# Patient Record
Sex: MALE | Race: WHITE | Employment: FULL TIME | URBAN - NONMETROPOLITAN AREA
[De-identification: names, ages, dates, MRNs, and addresses within clinical notes are randomized per-mention and may not be internally consistent; named-entity substitution may affect disease eponyms.]

---

## 2018-05-23 ENCOUNTER — HOSPITAL ENCOUNTER (OUTPATIENT)
Dept: LAB | Age: 23
Setting detail: SPECIMEN
Discharge: HOME | End: 2018-05-23
Payer: COMMERCIAL

## 2018-05-23 ENCOUNTER — OFFICE VISIT (OUTPATIENT)
Dept: PRIMARY CARE CLINIC | Age: 23
End: 2018-05-23
Payer: COMMERCIAL

## 2018-05-23 VITALS
BODY MASS INDEX: 27.3 KG/M2 | HEART RATE: 84 BPM | DIASTOLIC BLOOD PRESSURE: 78 MMHG | HEIGHT: 71 IN | OXYGEN SATURATION: 98 % | SYSTOLIC BLOOD PRESSURE: 124 MMHG | WEIGHT: 195 LBS

## 2018-05-23 DIAGNOSIS — F41.8 DEPRESSION WITH ANXIETY: Primary | ICD-10-CM

## 2018-05-23 DIAGNOSIS — F41.8 DEPRESSION WITH ANXIETY: ICD-10-CM

## 2018-05-23 LAB
ABSOLUTE EOS #: 0.1 K/UL (ref 0–0.4)
ABSOLUTE IMMATURE GRANULOCYTE: NORMAL K/UL (ref 0–0.3)
ABSOLUTE LYMPH #: 1.9 K/UL (ref 1–4.8)
ABSOLUTE MONO #: 0.7 K/UL (ref 0.1–1.2)
ALBUMIN SERPL-MCNC: 4.5 G/DL (ref 3.5–5.2)
ALBUMIN/GLOBULIN RATIO: 1.3 (ref 1–2.5)
ALP BLD-CCNC: 95 U/L (ref 40–129)
ALT SERPL-CCNC: 25 U/L (ref 5–41)
ANION GAP SERPL CALCULATED.3IONS-SCNC: 12 MMOL/L (ref 9–17)
AST SERPL-CCNC: 18 U/L
BASOPHILS # BLD: 0 % (ref 0–2)
BASOPHILS ABSOLUTE: 0 K/UL (ref 0–0.2)
BILIRUB SERPL-MCNC: 1.63 MG/DL (ref 0.3–1.2)
BUN BLDV-MCNC: 12 MG/DL (ref 6–20)
BUN/CREAT BLD: 15 (ref 9–20)
CALCIUM SERPL-MCNC: 9.8 MG/DL (ref 8.6–10.4)
CHLORIDE BLD-SCNC: 101 MMOL/L (ref 98–107)
CO2: 29 MMOL/L (ref 20–31)
CREAT SERPL-MCNC: 0.81 MG/DL (ref 0.7–1.2)
DIFFERENTIAL TYPE: NORMAL
EOSINOPHILS RELATIVE PERCENT: 1 % (ref 1–8)
GFR AFRICAN AMERICAN: >60 ML/MIN
GFR NON-AFRICAN AMERICAN: >60 ML/MIN
GFR SERPL CREATININE-BSD FRML MDRD: ABNORMAL ML/MIN/{1.73_M2}
GFR SERPL CREATININE-BSD FRML MDRD: ABNORMAL ML/MIN/{1.73_M2}
GLUCOSE BLD-MCNC: 95 MG/DL (ref 70–99)
HCT VFR BLD CALC: 50.6 % (ref 41–53)
HEMOGLOBIN: 17.2 G/DL (ref 13.5–17.5)
IMMATURE GRANULOCYTES: NORMAL %
LYMPHOCYTES # BLD: 23 % (ref 15–43)
MCH RBC QN AUTO: 29.6 PG (ref 26–34)
MCHC RBC AUTO-ENTMCNC: 33.9 G/DL (ref 31–37)
MCV RBC AUTO: 87.1 FL (ref 80–100)
MONOCYTES # BLD: 8 % (ref 6–14)
NRBC AUTOMATED: NORMAL PER 100 WBC
PDW BLD-RTO: 13.2 % (ref 11–14.5)
PLATELET # BLD: 266 K/UL (ref 140–450)
PLATELET ESTIMATE: NORMAL
PMV BLD AUTO: 8.8 FL (ref 6–12)
POTASSIUM SERPL-SCNC: 3.4 MMOL/L (ref 3.7–5.3)
RBC # BLD: 5.81 M/UL (ref 4.5–5.9)
RBC # BLD: NORMAL 10*6/UL
SEG NEUTROPHILS: 68 % (ref 44–74)
SEGMENTED NEUTROPHILS ABSOLUTE COUNT: 5.6 K/UL (ref 1.8–7.7)
SODIUM BLD-SCNC: 142 MMOL/L (ref 135–144)
TOTAL PROTEIN: 8.1 G/DL (ref 6.4–8.3)
TSH SERPL DL<=0.05 MIU/L-ACNC: 1.73 MIU/L (ref 0.3–5)
WBC # BLD: 8.3 K/UL (ref 3.5–11)
WBC # BLD: NORMAL 10*3/UL

## 2018-05-23 PROCEDURE — 4004F PT TOBACCO SCREEN RCVD TLK: CPT | Performed by: PHYSICIAN ASSISTANT

## 2018-05-23 PROCEDURE — 80053 COMPREHEN METABOLIC PANEL: CPT

## 2018-05-23 PROCEDURE — 84443 ASSAY THYROID STIM HORMONE: CPT

## 2018-05-23 PROCEDURE — G8427 DOCREV CUR MEDS BY ELIG CLIN: HCPCS | Performed by: PHYSICIAN ASSISTANT

## 2018-05-23 PROCEDURE — G8419 CALC BMI OUT NRM PARAM NOF/U: HCPCS | Performed by: PHYSICIAN ASSISTANT

## 2018-05-23 PROCEDURE — 85025 COMPLETE CBC W/AUTO DIFF WBC: CPT

## 2018-05-23 PROCEDURE — 36415 COLL VENOUS BLD VENIPUNCTURE: CPT

## 2018-05-23 PROCEDURE — 99204 OFFICE O/P NEW MOD 45 MIN: CPT | Performed by: PHYSICIAN ASSISTANT

## 2018-05-23 RX ORDER — DULOXETIN HYDROCHLORIDE 30 MG/1
30 CAPSULE, DELAYED RELEASE ORAL DAILY
Qty: 30 CAPSULE | Refills: 0 | Status: SHIPPED | OUTPATIENT
Start: 2018-05-23 | End: 2018-05-29 | Stop reason: DRUGHIGH

## 2018-05-23 ASSESSMENT — ENCOUNTER SYMPTOMS
BACK PAIN: 1
NAUSEA: 1
DIARRHEA: 1
VOMITING: 1
RESPIRATORY NEGATIVE: 1

## 2018-05-29 ENCOUNTER — OFFICE VISIT (OUTPATIENT)
Dept: FAMILY MEDICINE CLINIC | Age: 23
End: 2018-05-29
Payer: COMMERCIAL

## 2018-05-29 VITALS
OXYGEN SATURATION: 98 % | WEIGHT: 190 LBS | HEART RATE: 81 BPM | SYSTOLIC BLOOD PRESSURE: 124 MMHG | BODY MASS INDEX: 26.6 KG/M2 | HEIGHT: 71 IN | DIASTOLIC BLOOD PRESSURE: 70 MMHG

## 2018-05-29 DIAGNOSIS — F41.8 ANXIETY WITH DEPRESSION: Primary | ICD-10-CM

## 2018-05-29 PROCEDURE — G8419 CALC BMI OUT NRM PARAM NOF/U: HCPCS | Performed by: PHYSICIAN ASSISTANT

## 2018-05-29 PROCEDURE — 4004F PT TOBACCO SCREEN RCVD TLK: CPT | Performed by: PHYSICIAN ASSISTANT

## 2018-05-29 PROCEDURE — 99213 OFFICE O/P EST LOW 20 MIN: CPT | Performed by: PHYSICIAN ASSISTANT

## 2018-05-29 PROCEDURE — G8427 DOCREV CUR MEDS BY ELIG CLIN: HCPCS | Performed by: PHYSICIAN ASSISTANT

## 2018-05-29 RX ORDER — DULOXETIN HYDROCHLORIDE 60 MG/1
60 CAPSULE, DELAYED RELEASE ORAL DAILY
Qty: 30 CAPSULE | Refills: 2 | Status: ON HOLD | OUTPATIENT
Start: 2018-05-29 | End: 2018-06-04 | Stop reason: HOSPADM

## 2018-06-03 ENCOUNTER — HOSPITAL ENCOUNTER (INPATIENT)
Age: 23
LOS: 1 days | Discharge: HOME | DRG: 885 | End: 2018-06-04
Attending: PSYCHIATRY & NEUROLOGY | Admitting: PSYCHIATRY & NEUROLOGY
Payer: COMMERCIAL

## 2018-06-03 PROBLEM — F33.9 MAJOR DEPRESSIVE DISORDER, RECURRENT (HCC): Status: ACTIVE | Noted: 2018-06-03

## 2018-06-03 PROBLEM — F33.2 SEVERE EPISODE OF RECURRENT MAJOR DEPRESSIVE DISORDER, WITHOUT PSYCHOTIC FEATURES (HCC): Status: ACTIVE | Noted: 2018-06-03

## 2018-06-03 PROBLEM — F10.10 ALCOHOL ABUSE: Status: ACTIVE | Noted: 2018-06-03

## 2018-06-03 LAB
THYROXINE, FREE: 1.42 NG/DL (ref 0.93–1.7)
TSH SERPL DL<=0.05 MIU/L-ACNC: 1.44 MIU/L (ref 0.3–5)

## 2018-06-03 PROCEDURE — 6370000000 HC RX 637 (ALT 250 FOR IP): Performed by: PSYCHIATRY & NEUROLOGY

## 2018-06-03 PROCEDURE — 1240000000 HC EMOTIONAL WELLNESS R&B

## 2018-06-03 PROCEDURE — 36415 COLL VENOUS BLD VENIPUNCTURE: CPT

## 2018-06-03 PROCEDURE — 84443 ASSAY THYROID STIM HORMONE: CPT

## 2018-06-03 PROCEDURE — 90792 PSYCH DIAG EVAL W/MED SRVCS: CPT | Performed by: PSYCHIATRY & NEUROLOGY

## 2018-06-03 PROCEDURE — 84439 ASSAY OF FREE THYROXINE: CPT

## 2018-06-03 RX ORDER — DULOXETIN HYDROCHLORIDE 60 MG/1
60 CAPSULE, DELAYED RELEASE ORAL DAILY
Status: DISCONTINUED | OUTPATIENT
Start: 2018-06-04 | End: 2018-06-04 | Stop reason: HOSPADM

## 2018-06-03 RX ORDER — MAGNESIUM HYDROXIDE/ALUMINUM HYDROXICE/SIMETHICONE 120; 1200; 1200 MG/30ML; MG/30ML; MG/30ML
30 SUSPENSION ORAL EVERY 6 HOURS PRN
Status: DISCONTINUED | OUTPATIENT
Start: 2018-06-03 | End: 2018-06-04 | Stop reason: HOSPADM

## 2018-06-03 RX ORDER — ACETAMINOPHEN 325 MG/1
650 TABLET ORAL EVERY 4 HOURS PRN
Status: DISCONTINUED | OUTPATIENT
Start: 2018-06-03 | End: 2018-06-04 | Stop reason: HOSPADM

## 2018-06-03 RX ORDER — HYDROXYZINE HYDROCHLORIDE 25 MG/1
50 TABLET, FILM COATED ORAL 3 TIMES DAILY PRN
Status: DISCONTINUED | OUTPATIENT
Start: 2018-06-03 | End: 2018-06-04 | Stop reason: HOSPADM

## 2018-06-03 RX ORDER — BENZTROPINE MESYLATE 1 MG/ML
2 INJECTION INTRAMUSCULAR; INTRAVENOUS 2 TIMES DAILY PRN
Status: DISCONTINUED | OUTPATIENT
Start: 2018-06-03 | End: 2018-06-04 | Stop reason: HOSPADM

## 2018-06-03 RX ORDER — NICOTINE 21 MG/24HR
1 PATCH, TRANSDERMAL 24 HOURS TRANSDERMAL DAILY
Status: DISCONTINUED | OUTPATIENT
Start: 2018-06-03 | End: 2018-06-04

## 2018-06-03 RX ORDER — DULOXETIN HYDROCHLORIDE 30 MG/1
30 CAPSULE, DELAYED RELEASE ORAL DAILY
Status: DISCONTINUED | OUTPATIENT
Start: 2018-06-03 | End: 2018-06-03

## 2018-06-03 RX ORDER — TRAZODONE HYDROCHLORIDE 50 MG/1
50 TABLET ORAL NIGHTLY PRN
Status: DISCONTINUED | OUTPATIENT
Start: 2018-06-03 | End: 2018-06-04 | Stop reason: HOSPADM

## 2018-06-03 RX ADMIN — DULOXETINE HYDROCHLORIDE 30 MG: 30 CAPSULE, DELAYED RELEASE ORAL at 14:33

## 2018-06-03 RX ADMIN — NICOTINE POLACRILEX 2 MG: 2 GUM, CHEWING BUCCAL at 14:33

## 2018-06-03 ASSESSMENT — SLEEP AND FATIGUE QUESTIONNAIRES
AVERAGE NUMBER OF SLEEP HOURS: 5
DO YOU HAVE DIFFICULTY SLEEPING: NO
DO YOU USE A SLEEP AID: NO

## 2018-06-03 ASSESSMENT — LIFESTYLE VARIABLES: HISTORY_ALCOHOL_USE: NO

## 2018-06-04 VITALS
RESPIRATION RATE: 14 BRPM | SYSTOLIC BLOOD PRESSURE: 114 MMHG | HEIGHT: 71 IN | WEIGHT: 190 LBS | HEART RATE: 78 BPM | DIASTOLIC BLOOD PRESSURE: 71 MMHG | BODY MASS INDEX: 26.6 KG/M2 | TEMPERATURE: 97.9 F

## 2018-06-04 LAB
ABSOLUTE EOS #: 0.1 K/UL (ref 0–0.4)
ABSOLUTE IMMATURE GRANULOCYTE: ABNORMAL K/UL (ref 0–0.3)
ABSOLUTE LYMPH #: 2.1 K/UL (ref 1–4.8)
ABSOLUTE MONO #: 0.6 K/UL (ref 0.1–1.3)
ALBUMIN SERPL-MCNC: 4.2 G/DL (ref 3.5–5.2)
ALBUMIN/GLOBULIN RATIO: ABNORMAL (ref 1–2.5)
ALP BLD-CCNC: 92 U/L (ref 40–129)
ALT SERPL-CCNC: 20 U/L (ref 5–41)
ANION GAP SERPL CALCULATED.3IONS-SCNC: 11 MMOL/L (ref 9–17)
AST SERPL-CCNC: 17 U/L
BASOPHILS # BLD: 0 % (ref 0–2)
BASOPHILS ABSOLUTE: 0 K/UL (ref 0–0.2)
BILIRUB SERPL-MCNC: 1.58 MG/DL (ref 0.3–1.2)
BUN BLDV-MCNC: 15 MG/DL (ref 6–20)
BUN/CREAT BLD: ABNORMAL (ref 9–20)
CALCIUM SERPL-MCNC: 9.5 MG/DL (ref 8.6–10.4)
CHLORIDE BLD-SCNC: 102 MMOL/L (ref 98–107)
CHOLESTEROL/HDL RATIO: 3.9
CHOLESTEROL: 147 MG/DL
CO2: 30 MMOL/L (ref 20–31)
CREAT SERPL-MCNC: 0.95 MG/DL (ref 0.7–1.2)
DIFFERENTIAL TYPE: ABNORMAL
EOSINOPHILS RELATIVE PERCENT: 1 % (ref 0–4)
ESTIMATED AVERAGE GLUCOSE: 82 MG/DL
GFR AFRICAN AMERICAN: >60 ML/MIN
GFR NON-AFRICAN AMERICAN: >60 ML/MIN
GFR SERPL CREATININE-BSD FRML MDRD: ABNORMAL ML/MIN/{1.73_M2}
GFR SERPL CREATININE-BSD FRML MDRD: ABNORMAL ML/MIN/{1.73_M2}
GLUCOSE BLD-MCNC: 90 MG/DL (ref 70–99)
HBA1C MFR BLD: 4.5 % (ref 4–6)
HCT VFR BLD CALC: 48.7 % (ref 41–53)
HDLC SERPL-MCNC: 38 MG/DL
HEMOGLOBIN: 16.8 G/DL (ref 13.5–17.5)
IMMATURE GRANULOCYTES: ABNORMAL %
LDL CHOLESTEROL: 90 MG/DL (ref 0–130)
LYMPHOCYTES # BLD: 27 % (ref 24–44)
MCH RBC QN AUTO: 29.5 PG (ref 26–34)
MCHC RBC AUTO-ENTMCNC: 34.6 G/DL (ref 31–37)
MCV RBC AUTO: 85.4 FL (ref 80–100)
MONOCYTES # BLD: 8 % (ref 1–7)
NRBC AUTOMATED: ABNORMAL PER 100 WBC
PDW BLD-RTO: 13.2 % (ref 11.5–14.9)
PLATELET # BLD: 231 K/UL (ref 150–450)
PLATELET ESTIMATE: ABNORMAL
PMV BLD AUTO: 8.4 FL (ref 6–12)
POTASSIUM SERPL-SCNC: 4.1 MMOL/L (ref 3.7–5.3)
RBC # BLD: 5.7 M/UL (ref 4.5–5.9)
RBC # BLD: ABNORMAL 10*6/UL
SEG NEUTROPHILS: 64 % (ref 36–66)
SEGMENTED NEUTROPHILS ABSOLUTE COUNT: 4.9 K/UL (ref 1.3–9.1)
SODIUM BLD-SCNC: 143 MMOL/L (ref 135–144)
TOTAL PROTEIN: 7.3 G/DL (ref 6.4–8.3)
TRIGL SERPL-MCNC: 93 MG/DL
VLDLC SERPL CALC-MCNC: ABNORMAL MG/DL (ref 1–30)
WBC # BLD: 7.7 K/UL (ref 3.5–11)
WBC # BLD: ABNORMAL 10*3/UL

## 2018-06-04 PROCEDURE — 5130000000 HC BRIDGE APPOINTMENT: Performed by: COUNSELOR

## 2018-06-04 PROCEDURE — 99238 HOSP IP/OBS DSCHRG MGMT 30/<: CPT | Performed by: PSYCHIATRY & NEUROLOGY

## 2018-06-04 PROCEDURE — 80053 COMPREHEN METABOLIC PANEL: CPT

## 2018-06-04 PROCEDURE — 36415 COLL VENOUS BLD VENIPUNCTURE: CPT

## 2018-06-04 PROCEDURE — 85025 COMPLETE CBC W/AUTO DIFF WBC: CPT

## 2018-06-04 PROCEDURE — 80061 LIPID PANEL: CPT

## 2018-06-04 PROCEDURE — 83036 HEMOGLOBIN GLYCOSYLATED A1C: CPT

## 2018-06-04 RX ORDER — DULOXETIN HYDROCHLORIDE 60 MG/1
60 CAPSULE, DELAYED RELEASE ORAL DAILY
Qty: 30 CAPSULE | Refills: 3 | Status: SHIPPED | OUTPATIENT
Start: 2018-06-05 | End: 2018-11-07 | Stop reason: SDUPTHER

## 2018-06-04 ASSESSMENT — SLEEP AND FATIGUE QUESTIONNAIRES
DO YOU HAVE DIFFICULTY SLEEPING: NO
AVERAGE NUMBER OF SLEEP HOURS: 6
DO YOU USE A SLEEP AID: NO

## 2018-06-04 ASSESSMENT — LIFESTYLE VARIABLES: HISTORY_ALCOHOL_USE: NO

## 2018-06-11 ENCOUNTER — OFFICE VISIT (OUTPATIENT)
Dept: FAMILY MEDICINE CLINIC | Age: 23
End: 2018-06-11
Payer: COMMERCIAL

## 2018-06-11 VITALS
HEIGHT: 71 IN | BODY MASS INDEX: 26.6 KG/M2 | WEIGHT: 190 LBS | OXYGEN SATURATION: 98 % | SYSTOLIC BLOOD PRESSURE: 124 MMHG | HEART RATE: 80 BPM | DIASTOLIC BLOOD PRESSURE: 70 MMHG

## 2018-06-11 DIAGNOSIS — F41.8 DEPRESSION WITH ANXIETY: Primary | ICD-10-CM

## 2018-06-11 PROCEDURE — G8427 DOCREV CUR MEDS BY ELIG CLIN: HCPCS | Performed by: PHYSICIAN ASSISTANT

## 2018-06-11 PROCEDURE — 4004F PT TOBACCO SCREEN RCVD TLK: CPT | Performed by: PHYSICIAN ASSISTANT

## 2018-06-11 PROCEDURE — G8419 CALC BMI OUT NRM PARAM NOF/U: HCPCS | Performed by: PHYSICIAN ASSISTANT

## 2018-06-11 PROCEDURE — 1111F DSCHRG MED/CURRENT MED MERGE: CPT | Performed by: PHYSICIAN ASSISTANT

## 2018-06-11 PROCEDURE — 99213 OFFICE O/P EST LOW 20 MIN: CPT | Performed by: PHYSICIAN ASSISTANT

## 2018-06-11 ASSESSMENT — ENCOUNTER SYMPTOMS
DIARRHEA: 0
VOMITING: 0
NAUSEA: 0
SHORTNESS OF BREATH: 0

## 2018-06-15 ASSESSMENT — ENCOUNTER SYMPTOMS: CHEST TIGHTNESS: 0

## 2018-07-03 ENCOUNTER — OFFICE VISIT (OUTPATIENT)
Dept: FAMILY MEDICINE CLINIC | Age: 23
End: 2018-07-03
Payer: COMMERCIAL

## 2018-07-03 VITALS
OXYGEN SATURATION: 99 % | BODY MASS INDEX: 27.58 KG/M2 | RESPIRATION RATE: 16 BRPM | DIASTOLIC BLOOD PRESSURE: 72 MMHG | SYSTOLIC BLOOD PRESSURE: 126 MMHG | HEIGHT: 71 IN | WEIGHT: 197 LBS | HEART RATE: 82 BPM

## 2018-07-03 DIAGNOSIS — F41.8 DEPRESSION WITH ANXIETY: ICD-10-CM

## 2018-07-03 DIAGNOSIS — L25.5 PLANT DERMATITIS: Primary | ICD-10-CM

## 2018-07-03 PROCEDURE — 99213 OFFICE O/P EST LOW 20 MIN: CPT | Performed by: PHYSICIAN ASSISTANT

## 2018-07-03 PROCEDURE — G8419 CALC BMI OUT NRM PARAM NOF/U: HCPCS | Performed by: PHYSICIAN ASSISTANT

## 2018-07-03 PROCEDURE — 1111F DSCHRG MED/CURRENT MED MERGE: CPT | Performed by: PHYSICIAN ASSISTANT

## 2018-07-03 PROCEDURE — G8427 DOCREV CUR MEDS BY ELIG CLIN: HCPCS | Performed by: PHYSICIAN ASSISTANT

## 2018-07-03 PROCEDURE — 4004F PT TOBACCO SCREEN RCVD TLK: CPT | Performed by: PHYSICIAN ASSISTANT

## 2018-07-03 RX ORDER — METHYLPREDNISOLONE 4 MG/1
TABLET ORAL
Qty: 1 KIT | Refills: 0 | Status: SHIPPED | OUTPATIENT
Start: 2018-07-03 | End: 2018-07-09

## 2018-07-03 ASSESSMENT — PATIENT HEALTH QUESTIONNAIRE - PHQ9
1. LITTLE INTEREST OR PLEASURE IN DOING THINGS: 0
2. FEELING DOWN, DEPRESSED OR HOPELESS: 0
SUM OF ALL RESPONSES TO PHQ QUESTIONS 1-9: 0
SUM OF ALL RESPONSES TO PHQ9 QUESTIONS 1 & 2: 0

## 2018-07-03 NOTE — PROGRESS NOTES
Diagnosis Orders   1. Plant dermatitis  methylPREDNISolone (MEDROL DOSEPACK) 4 MG tablet   depression with anxiety        Plan:      Continue on all medications  Coping mechanisms  Follow-up with psychiatry  Follow-up with me one month sooner if problems  Good nutrition hydration  Anger control  Answered all his questions    Parviz León received counseling on the following healthy behaviors: nutrition, exercise, medication adherence and mood    Patient given educational materials on Nutrition, Exercise and mood    I have instructed Parviz León to complete a self tracking handout on Blood Pressures , Weights and mood and instructed them to bring it with them to his next appointment. Discussed use, benefit, and side effects of prescribed medications. Barriers to medication compliance addressed. All patient questions answered. Pt voiced understanding.

## 2018-07-10 ASSESSMENT — ENCOUNTER SYMPTOMS
SHORTNESS OF BREATH: 0
COUGH: 0
VOMITING: 0
DIARRHEA: 0
NAUSEA: 0
CHEST TIGHTNESS: 0

## 2018-09-05 ENCOUNTER — OFFICE VISIT (OUTPATIENT)
Dept: FAMILY MEDICINE CLINIC | Age: 23
End: 2018-09-05
Payer: COMMERCIAL

## 2018-09-05 VITALS
DIASTOLIC BLOOD PRESSURE: 60 MMHG | SYSTOLIC BLOOD PRESSURE: 124 MMHG | HEART RATE: 60 BPM | RESPIRATION RATE: 16 BRPM | HEIGHT: 71 IN | WEIGHT: 199 LBS | BODY MASS INDEX: 27.86 KG/M2

## 2018-09-05 DIAGNOSIS — F41.8 ANXIETY WITH DEPRESSION: Primary | ICD-10-CM

## 2018-09-05 PROCEDURE — 99213 OFFICE O/P EST LOW 20 MIN: CPT | Performed by: PHYSICIAN ASSISTANT

## 2018-09-05 PROCEDURE — G8427 DOCREV CUR MEDS BY ELIG CLIN: HCPCS | Performed by: PHYSICIAN ASSISTANT

## 2018-09-05 PROCEDURE — 4004F PT TOBACCO SCREEN RCVD TLK: CPT | Performed by: PHYSICIAN ASSISTANT

## 2018-09-05 PROCEDURE — G8419 CALC BMI OUT NRM PARAM NOF/U: HCPCS | Performed by: PHYSICIAN ASSISTANT

## 2018-09-05 RX ORDER — DULOXETIN HYDROCHLORIDE 60 MG/1
60 CAPSULE, DELAYED RELEASE ORAL DAILY
Qty: 30 CAPSULE | Refills: 5 | Status: SHIPPED | OUTPATIENT
Start: 2018-09-05 | End: 2019-08-09

## 2018-09-05 ASSESSMENT — PATIENT HEALTH QUESTIONNAIRE - PHQ9
SUM OF ALL RESPONSES TO PHQ9 QUESTIONS 1 & 2: 0
1. LITTLE INTEREST OR PLEASURE IN DOING THINGS: 0
SUM OF ALL RESPONSES TO PHQ QUESTIONS 1-9: 0
SUM OF ALL RESPONSES TO PHQ QUESTIONS 1-9: 0
2. FEELING DOWN, DEPRESSED OR HOPELESS: 0

## 2018-09-05 NOTE — PROGRESS NOTES
Subjective:      Patient ID: Cory Diaz is a 24 y.o. male. Patient is seen following up on mood. Has an appointment at [facility] and sees Dr. Laurent Oneill they did discuss his sleep. Melatonin did not help. Sleep is still a problems. Thinks had food poisoning this weekend. It is improving. Has a HA thinks it is from the heat primarily. Not really drinking any pop. His mood is better not sad or down like he was. Handling stress at work. Not tearful overall. Sometimes has a down mood but not like he was. Is stressing out about psych rosario admission. Review of Systems   Constitutional: Negative for appetite change and fatigue. Endocrine:        Since on cymbalta his sweat smells different. Psychiatric/Behavioral: Positive for sleep disturbance. Negative for agitation, behavioral problems, self-injury and suicidal ideas. The patient is not nervous/anxious. No issues with anger. Able to let things go. Past Medical History:   Diagnosis Date    Anxiety      History reviewed. No pertinent surgical history. Social History     Social History    Marital status: Single     Spouse name: N/A    Number of children: N/A    Years of education: N/A     Occupational History    Not on file.      Social History Main Topics    Smoking status: Never Smoker    Smokeless tobacco: Current User     Types: Chew    Alcohol use Not on file    Drug use: Unknown    Sexual activity: Not on file     Other Topics Concern    Not on file     Social History Narrative    No narrative on file     Family History   Problem Relation Age of Onset    Mental Illness Mother         took xanax prn over years       No Known Allergies    /60   Pulse 60   Resp 16   Ht 5' 11\" (1.803 m)   Wt 199 lb (90.3 kg)   BMI 27.75 kg/m²   Current Outpatient Prescriptions   Medication Sig Dispense Refill    DULoxetine (CYMBALTA) 60 MG extended release capsule Take 1 capsule by mouth daily 30 capsule 5    DULoxetine (CYMBALTA) 60 MG extended release capsule Take 1 capsule by mouth daily 30 capsule 3     No current facility-administered medications for this visit. Objective:   Physical Exam   Constitutional: He is oriented to person, place, and time. He appears well-developed and well-nourished. He appears distressed. HENT:   Head: Normocephalic and atraumatic. Nose: Nose normal.   Eyes: Conjunctivae are normal. No scleral icterus. Pulmonary/Chest: Effort normal.   Musculoskeletal: He exhibits no edema. Neurological: He is alert and oriented to person, place, and time. Skin: Skin is warm and dry. Psychiatric: He has a normal mood and affect. His behavior is normal. Judgment and thought content normal.   Patient is much more talkative today. Seems to be in a good mood not as uptight. Not tearful at all. Good eye contact. He is coherent. Speech is normal responds appropriately when questioned. No evidence for suicide homicidal ideation. Nursing note and vitals reviewed. Assessment:       Diagnosis Orders   1. Anxiety with depression  DULoxetine (CYMBALTA) 60 MG extended release capsule           Plan:      Follow-up with psychiatry as scheduled  Good nutrition hydration  Avoid situations that trigger his anger And increased alcohol use  Follow-up with me in 2 months sooner if problems  Coping mechanisms    Parviz León received counseling on the following healthy behaviors: nutrition, exercise and medication adherence    Patient given educational materials on Nutrition, Exercise and mood    I have instructed Parviz León to complete a self tracking handout on Weights and mood and instructed them to bring it with them to his next appointment. Discussed use, benefit, and side effects of prescribed medications. Barriers to medication compliance addressed. All patient questions answered. Pt voiced understanding.            [address], [place]

## 2018-11-07 ENCOUNTER — OFFICE VISIT (OUTPATIENT)
Dept: FAMILY MEDICINE CLINIC | Age: 23
End: 2018-11-07
Payer: COMMERCIAL

## 2018-11-07 VITALS
HEART RATE: 77 BPM | BODY MASS INDEX: 29.82 KG/M2 | HEIGHT: 71 IN | DIASTOLIC BLOOD PRESSURE: 76 MMHG | OXYGEN SATURATION: 99 % | RESPIRATION RATE: 16 BRPM | SYSTOLIC BLOOD PRESSURE: 108 MMHG | WEIGHT: 213 LBS

## 2018-11-07 DIAGNOSIS — F33.1 MODERATE EPISODE OF RECURRENT MAJOR DEPRESSIVE DISORDER (HCC): ICD-10-CM

## 2018-11-07 DIAGNOSIS — L85.3 XEROSIS CUTIS: ICD-10-CM

## 2018-11-07 DIAGNOSIS — M25.50 ARTHRALGIA, UNSPECIFIED JOINT: Primary | ICD-10-CM

## 2018-11-07 PROCEDURE — G8427 DOCREV CUR MEDS BY ELIG CLIN: HCPCS | Performed by: PHYSICIAN ASSISTANT

## 2018-11-07 PROCEDURE — 99213 OFFICE O/P EST LOW 20 MIN: CPT | Performed by: PHYSICIAN ASSISTANT

## 2018-11-07 PROCEDURE — 4004F PT TOBACCO SCREEN RCVD TLK: CPT | Performed by: PHYSICIAN ASSISTANT

## 2018-11-07 PROCEDURE — G8419 CALC BMI OUT NRM PARAM NOF/U: HCPCS | Performed by: PHYSICIAN ASSISTANT

## 2018-11-07 PROCEDURE — G8484 FLU IMMUNIZE NO ADMIN: HCPCS | Performed by: PHYSICIAN ASSISTANT

## 2018-11-07 RX ORDER — DULOXETIN HYDROCHLORIDE 30 MG/1
30 CAPSULE, DELAYED RELEASE ORAL DAILY
COMMUNITY
End: 2019-02-07

## 2018-11-07 ASSESSMENT — PATIENT HEALTH QUESTIONNAIRE - PHQ9
SUM OF ALL RESPONSES TO PHQ QUESTIONS 1-9: 0
2. FEELING DOWN, DEPRESSED OR HOPELESS: 0
SUM OF ALL RESPONSES TO PHQ9 QUESTIONS 1 & 2: 0
1. LITTLE INTEREST OR PLEASURE IN DOING THINGS: 0
SUM OF ALL RESPONSES TO PHQ QUESTIONS 1-9: 0

## 2018-11-07 ASSESSMENT — ENCOUNTER SYMPTOMS
GASTROINTESTINAL NEGATIVE: 1
RESPIRATORY NEGATIVE: 1

## 2019-02-07 ENCOUNTER — OFFICE VISIT (OUTPATIENT)
Dept: FAMILY MEDICINE CLINIC | Age: 24
End: 2019-02-07
Payer: COMMERCIAL

## 2019-02-07 VITALS
BODY MASS INDEX: 30.38 KG/M2 | OXYGEN SATURATION: 99 % | HEIGHT: 71 IN | RESPIRATION RATE: 15 BRPM | DIASTOLIC BLOOD PRESSURE: 76 MMHG | WEIGHT: 217 LBS | SYSTOLIC BLOOD PRESSURE: 124 MMHG | HEART RATE: 81 BPM

## 2019-02-07 DIAGNOSIS — F33.41 RECURRENT MAJOR DEPRESSIVE DISORDER, IN PARTIAL REMISSION (HCC): Primary | ICD-10-CM

## 2019-02-07 PROCEDURE — G8427 DOCREV CUR MEDS BY ELIG CLIN: HCPCS | Performed by: PHYSICIAN ASSISTANT

## 2019-02-07 PROCEDURE — G8417 CALC BMI ABV UP PARAM F/U: HCPCS | Performed by: PHYSICIAN ASSISTANT

## 2019-02-07 PROCEDURE — 99213 OFFICE O/P EST LOW 20 MIN: CPT | Performed by: PHYSICIAN ASSISTANT

## 2019-02-07 PROCEDURE — G8484 FLU IMMUNIZE NO ADMIN: HCPCS | Performed by: PHYSICIAN ASSISTANT

## 2019-02-07 PROCEDURE — 4004F PT TOBACCO SCREEN RCVD TLK: CPT | Performed by: PHYSICIAN ASSISTANT

## 2019-02-07 ASSESSMENT — PATIENT HEALTH QUESTIONNAIRE - PHQ9
SUM OF ALL RESPONSES TO PHQ9 QUESTIONS 1 & 2: 2
SUM OF ALL RESPONSES TO PHQ QUESTIONS 1-9: 2
SUM OF ALL RESPONSES TO PHQ QUESTIONS 1-9: 2
1. LITTLE INTEREST OR PLEASURE IN DOING THINGS: 1
2. FEELING DOWN, DEPRESSED OR HOPELESS: 1

## 2019-02-07 ASSESSMENT — ENCOUNTER SYMPTOMS
SHORTNESS OF BREATH: 0
COUGH: 0
WHEEZING: 0
NAUSEA: 0
VOMITING: 0
DIARRHEA: 0

## 2019-08-09 ENCOUNTER — OFFICE VISIT (OUTPATIENT)
Dept: FAMILY MEDICINE CLINIC | Age: 24
End: 2019-08-09
Payer: COMMERCIAL

## 2019-08-09 VITALS
HEIGHT: 71 IN | HEART RATE: 90 BPM | DIASTOLIC BLOOD PRESSURE: 64 MMHG | OXYGEN SATURATION: 98 % | RESPIRATION RATE: 14 BRPM | WEIGHT: 218 LBS | SYSTOLIC BLOOD PRESSURE: 124 MMHG | BODY MASS INDEX: 30.52 KG/M2

## 2019-08-09 DIAGNOSIS — M25.50 ARTHRALGIA, UNSPECIFIED JOINT: ICD-10-CM

## 2019-08-09 DIAGNOSIS — R53.83 FATIGUE, UNSPECIFIED TYPE: ICD-10-CM

## 2019-08-09 DIAGNOSIS — Z23 NEED FOR TETANUS BOOSTER: Primary | ICD-10-CM

## 2019-08-09 DIAGNOSIS — F33.41 RECURRENT MAJOR DEPRESSIVE DISORDER, IN PARTIAL REMISSION (HCC): ICD-10-CM

## 2019-08-09 DIAGNOSIS — F41.1 GENERALIZED ANXIETY DISORDER: ICD-10-CM

## 2019-08-09 PROCEDURE — G8427 DOCREV CUR MEDS BY ELIG CLIN: HCPCS | Performed by: PHYSICIAN ASSISTANT

## 2019-08-09 PROCEDURE — 4004F PT TOBACCO SCREEN RCVD TLK: CPT | Performed by: PHYSICIAN ASSISTANT

## 2019-08-09 PROCEDURE — G8417 CALC BMI ABV UP PARAM F/U: HCPCS | Performed by: PHYSICIAN ASSISTANT

## 2019-08-09 PROCEDURE — 99214 OFFICE O/P EST MOD 30 MIN: CPT | Performed by: PHYSICIAN ASSISTANT

## 2019-08-09 PROCEDURE — 90471 IMMUNIZATION ADMIN: CPT | Performed by: PHYSICIAN ASSISTANT

## 2019-08-09 PROCEDURE — 90715 TDAP VACCINE 7 YRS/> IM: CPT | Performed by: PHYSICIAN ASSISTANT

## 2019-08-09 RX ORDER — MELOXICAM 7.5 MG/1
7.5 TABLET ORAL 2 TIMES DAILY PRN
Qty: 30 TABLET | Refills: 2 | Status: SHIPPED | OUTPATIENT
Start: 2019-08-09

## 2019-08-09 RX ORDER — DULOXETIN HYDROCHLORIDE 30 MG/1
30 CAPSULE, DELAYED RELEASE ORAL DAILY
Refills: 0 | COMMUNITY
Start: 2019-07-29

## 2019-08-09 ASSESSMENT — ENCOUNTER SYMPTOMS
NAUSEA: 0
COLOR CHANGE: 1
RESPIRATORY NEGATIVE: 1
DIARRHEA: 0
VOMITING: 0

## 2019-08-09 NOTE — PROGRESS NOTES
Objective:      /64   Pulse 90   Resp 14   Ht 5' 11\" (1.803 m)   Wt 218 lb (98.9 kg)   SpO2 98%   BMI 30.40 kg/m²     Physical Exam   Constitutional: He is oriented to person, place, and time. He appears well-developed and well-nourished. No distress. HENT:   Head: Normocephalic and atraumatic. Nose: Nose normal.   Mouth/Throat: Oropharynx is clear and moist. No oropharyngeal exudate. Eyes: Conjunctivae are normal. No scleral icterus. Neck: Normal range of motion. Neck supple. No thyromegaly present. Cardiovascular: Normal rate, regular rhythm and normal heart sounds. No murmur heard. Pulmonary/Chest: Effort normal and breath sounds normal. He has no wheezes. He has no rales. Abdominal: Soft. Bowel sounds are normal. He exhibits no distension and no mass. There is no tenderness. There is no rebound and no guarding. No hernia. Musculoskeletal: He exhibits no edema. Lymphadenopathy:     He has no cervical adenopathy. Neurological: He is alert and oriented to person, place, and time. Skin: Skin is warm and dry. No rash noted. There is erythema. He has pink slightly white slightly silvery change on the anterior knees bilaterally. Elbows are clear. Psychiatric: He has a normal mood and affect. His behavior is normal. Judgment and thought content normal.   Mood and affect are appropriate for patient. Good eye contact. Speech is normal responds appropriately when questioned. No evidence for suicide homicidal ideation. He is coherent. Not tearful or anxious. Thought processing content appropriate. Nursing note and vitals reviewed. Assessment & Plan:      Diagnosis Orders   1. Need for tetanus booster  Tdap (age 6y and older) IM ([address])    Tdap (age 6y and older) IM ([address])   2. Fatigue, unspecified type  CBC Auto Differential    TSH With Reflex Ft4    Mononucleosis Screen    Trav-Barr Virus VCA Antibody Panel    Basic Metabolic Panel   3.  Arthralgia, unspecified joint  meloxicam (MOBIC) 7.5 MG tablet   4. Recurrent major depressive disorder, in partial remission ([facility] [address]     5. Generalized anxiety disorder       Will be notified of all results  Further treatment based on results  Good nutrition hydration  Coping mechanisms  Get proper rest  He may have psoriasis on his knees need to monitor did not see any changes on his elbows  Tetanus will be updated today  Follow-up with me 6 months sooner if problems  Follow-up with psychiatry at [facility] received counseling on the following healthy behaviors: nutrition, exercise, medication adherence and mood    Patient given educational materials on Nutrition, Exercise, Hypertension and mood    I have instructed Sammi Carrillo to complete a self tracking handout on Weights and mood and instructed them to bring it with them to his next appointment. Discussed use, benefit, and side effects of prescribed medications. Barriers to medication compliance addressed. All patient questions answered. Pt voiced understanding.          SYBIL Landrum  8/9/2019 4:23 PM    (Pleasenote that portions of this note were completed with a voice recognition program.Efforts were made to edit the dictations but occasionally words are mis-transcribed.)

## 2019-08-10 ENCOUNTER — HOSPITAL ENCOUNTER (OUTPATIENT)
Dept: LAB | Age: 24
Discharge: HOME | End: 2019-08-10
Payer: COMMERCIAL

## 2019-08-10 DIAGNOSIS — R53.83 FATIGUE, UNSPECIFIED TYPE: ICD-10-CM

## 2019-08-10 DIAGNOSIS — M25.50 ARTHRALGIA, UNSPECIFIED JOINT: ICD-10-CM

## 2019-08-10 LAB
ABSOLUTE EOS #: 0.1 K/UL (ref 0–0.4)
ABSOLUTE IMMATURE GRANULOCYTE: ABNORMAL K/UL (ref 0–0.3)
ABSOLUTE LYMPH #: 1.5 K/UL (ref 1–4.8)
ABSOLUTE MONO #: 0.5 K/UL (ref 0.1–1.2)
ANION GAP SERPL CALCULATED.3IONS-SCNC: 11 MMOL/L (ref 9–17)
BASOPHILS # BLD: 0 % (ref 0–2)
BASOPHILS ABSOLUTE: 0 K/UL (ref 0–0.2)
BUN BLDV-MCNC: 11 MG/DL (ref 6–20)
BUN/CREAT BLD: 13 (ref 9–20)
C-REACTIVE PROTEIN: 1.6 MG/L (ref 0–5)
CALCIUM SERPL-MCNC: 9.8 MG/DL (ref 8.6–10.4)
CHLORIDE BLD-SCNC: 100 MMOL/L (ref 98–107)
CO2: 29 MMOL/L (ref 20–31)
CREAT SERPL-MCNC: 0.85 MG/DL (ref 0.7–1.2)
DIFFERENTIAL TYPE: ABNORMAL
EOSINOPHILS RELATIVE PERCENT: 2 % (ref 1–8)
GFR AFRICAN AMERICAN: >60 ML/MIN
GFR NON-AFRICAN AMERICAN: >60 ML/MIN
GFR SERPL CREATININE-BSD FRML MDRD: NORMAL ML/MIN/{1.73_M2}
GFR SERPL CREATININE-BSD FRML MDRD: NORMAL ML/MIN/{1.73_M2}
GLUCOSE BLD-MCNC: 88 MG/DL (ref 70–99)
HCT VFR BLD CALC: 49.8 % (ref 41–53)
HEMOGLOBIN: 17.1 G/DL (ref 13.5–17.5)
IMMATURE GRANULOCYTES: ABNORMAL %
LYMPHOCYTES # BLD: 17 % (ref 15–43)
MCH RBC QN AUTO: 29.5 PG (ref 26–34)
MCHC RBC AUTO-ENTMCNC: 34.2 G/DL (ref 31–37)
MCV RBC AUTO: 86 FL (ref 80–100)
MONOCYTES # BLD: 6 % (ref 6–14)
MONONUCLEOSIS SCREEN: NEGATIVE
NRBC AUTOMATED: ABNORMAL PER 100 WBC
PDW BLD-RTO: 13.3 % (ref 11–14.5)
PLATELET # BLD: 269 K/UL (ref 140–450)
PLATELET ESTIMATE: ABNORMAL
PMV BLD AUTO: 8.4 FL (ref 6–12)
POTASSIUM SERPL-SCNC: 4 MMOL/L (ref 3.7–5.3)
RBC # BLD: 5.79 M/UL (ref 4.5–5.9)
RBC # BLD: ABNORMAL 10*6/UL
RHEUMATOID FACTOR: <10 IU/ML
SEDIMENTATION RATE, ERYTHROCYTE: 1 MM (ref 0–15)
SEG NEUTROPHILS: 75 % (ref 44–74)
SEGMENTED NEUTROPHILS ABSOLUTE COUNT: 6.6 K/UL (ref 1.8–7.7)
SODIUM BLD-SCNC: 140 MMOL/L (ref 135–144)
TSH SERPL DL<=0.05 MIU/L-ACNC: 1.05 MIU/L (ref 0.3–5)
WBC # BLD: 8.7 K/UL (ref 3.5–11)
WBC # BLD: ABNORMAL 10*3/UL

## 2019-08-10 PROCEDURE — 86431 RHEUMATOID FACTOR QUANT: CPT

## 2019-08-10 PROCEDURE — 36415 COLL VENOUS BLD VENIPUNCTURE: CPT

## 2019-08-10 PROCEDURE — 85025 COMPLETE CBC W/AUTO DIFF WBC: CPT

## 2019-08-10 PROCEDURE — 86812 HLA TYPING A B OR C: CPT

## 2019-08-10 PROCEDURE — 86140 C-REACTIVE PROTEIN: CPT

## 2019-08-10 PROCEDURE — 86665 EPSTEIN-BARR CAPSID VCA: CPT

## 2019-08-10 PROCEDURE — 86664 EPSTEIN-BARR NUCLEAR ANTIGEN: CPT

## 2019-08-10 PROCEDURE — 86038 ANTINUCLEAR ANTIBODIES: CPT

## 2019-08-10 PROCEDURE — 86663 EPSTEIN-BARR ANTIBODY: CPT

## 2019-08-10 PROCEDURE — 80048 BASIC METABOLIC PNL TOTAL CA: CPT

## 2019-08-10 PROCEDURE — 84443 ASSAY THYROID STIM HORMONE: CPT

## 2019-08-10 PROCEDURE — 86308 HETEROPHILE ANTIBODY SCREEN: CPT

## 2019-08-10 PROCEDURE — 85651 RBC SED RATE NONAUTOMATED: CPT

## 2019-08-12 LAB
ANTI-NUCLEAR ANTIBODY (ANA): ABNORMAL
HLA B27: NEGATIVE

## 2019-08-13 DIAGNOSIS — M25.50 ARTHRALGIA, UNSPECIFIED JOINT: Primary | ICD-10-CM

## 2019-08-14 LAB
EBV EARLY ANTIGEN IGG: 35 U/ML
EBV INTERPRETATION: ABNORMAL
EBV NUCLEAR AG AB: 93 U/ML
EPSTEIN-BARR VCA IGG: 1464 U/ML
EPSTEIN-BARR VCA IGM: 104 U/ML

## 2023-03-13 ENCOUNTER — NURSE TRIAGE (OUTPATIENT)
Dept: OTHER | Facility: CLINIC | Age: 28
End: 2023-03-13

## 2023-03-13 NOTE — TELEPHONE ENCOUNTER
Reason for Disposition   Information only question and nurse able to answer    Protocols used: Information Only Call - No Triage-ADULT-[place]  Needed help finding a PCP. Provided with website to search.

## 2023-03-14 ENCOUNTER — OFFICE VISIT (OUTPATIENT)
Dept: FAMILY MEDICINE CLINIC | Age: 28
End: 2023-03-14
Payer: COMMERCIAL

## 2023-03-14 VITALS
SYSTOLIC BLOOD PRESSURE: 124 MMHG | WEIGHT: 218 LBS | HEART RATE: 78 BPM | TEMPERATURE: 97.2 F | BODY MASS INDEX: 30.4 KG/M2 | DIASTOLIC BLOOD PRESSURE: 84 MMHG | OXYGEN SATURATION: 99 %

## 2023-03-14 DIAGNOSIS — M54.10 BACK PAIN WITH RADICULOPATHY: ICD-10-CM

## 2023-03-14 DIAGNOSIS — F41.9 ANXIETY: Primary | ICD-10-CM

## 2023-03-14 PROCEDURE — 4004F PT TOBACCO SCREEN RCVD TLK: CPT | Performed by: NURSE PRACTITIONER

## 2023-03-14 PROCEDURE — 99214 OFFICE O/P EST MOD 30 MIN: CPT | Performed by: NURSE PRACTITIONER

## 2023-03-14 PROCEDURE — G8484 FLU IMMUNIZE NO ADMIN: HCPCS | Performed by: NURSE PRACTITIONER

## 2023-03-14 PROCEDURE — G8427 DOCREV CUR MEDS BY ELIG CLIN: HCPCS | Performed by: NURSE PRACTITIONER

## 2023-03-14 PROCEDURE — G8419 CALC BMI OUT NRM PARAM NOF/U: HCPCS | Performed by: NURSE PRACTITIONER

## 2023-03-14 RX ORDER — DULOXETIN HYDROCHLORIDE 30 MG/1
30 CAPSULE, DELAYED RELEASE ORAL DAILY
Qty: 30 CAPSULE | Refills: 0 | Status: SHIPPED | OUTPATIENT
Start: 2023-03-14

## 2023-03-14 RX ORDER — BUSPIRONE HYDROCHLORIDE 5 MG/1
5 TABLET ORAL 2 TIMES DAILY
Qty: 60 TABLET | Refills: 0 | Status: SHIPPED | OUTPATIENT
Start: 2023-03-14 | End: 2023-04-13

## 2023-03-14 RX ORDER — MELOXICAM 15 MG/1
15 TABLET ORAL DAILY
Qty: 30 TABLET | Refills: 0 | Status: SHIPPED | OUTPATIENT
Start: 2023-03-14

## 2023-03-14 SDOH — ECONOMIC STABILITY: FOOD INSECURITY: WITHIN THE PAST 12 MONTHS, THE FOOD YOU BOUGHT JUST DIDN'T LAST AND YOU DIDN'T HAVE MONEY TO GET MORE.: NEVER TRUE

## 2023-03-14 SDOH — ECONOMIC STABILITY: FOOD INSECURITY: WITHIN THE PAST 12 MONTHS, YOU WORRIED THAT YOUR FOOD WOULD RUN OUT BEFORE YOU GOT MONEY TO BUY MORE.: NEVER TRUE

## 2023-03-14 SDOH — ECONOMIC STABILITY: INCOME INSECURITY: HOW HARD IS IT FOR YOU TO PAY FOR THE VERY BASICS LIKE FOOD, HOUSING, MEDICAL CARE, AND HEATING?: NOT HARD AT ALL

## 2023-03-14 SDOH — ECONOMIC STABILITY: HOUSING INSECURITY
IN THE LAST 12 MONTHS, WAS THERE A TIME WHEN YOU DID NOT HAVE A STEADY PLACE TO SLEEP OR SLEPT IN A SHELTER (INCLUDING NOW)?: NO

## 2023-03-14 ASSESSMENT — PATIENT HEALTH QUESTIONNAIRE - PHQ9
7. TROUBLE CONCENTRATING ON THINGS, SUCH AS READING THE NEWSPAPER OR WATCHING TELEVISION: 1
6. FEELING BAD ABOUT YOURSELF - OR THAT YOU ARE A FAILURE OR HAVE LET YOURSELF OR YOUR FAMILY DOWN: 1
10. IF YOU CHECKED OFF ANY PROBLEMS, HOW DIFFICULT HAVE THESE PROBLEMS MADE IT FOR YOU TO DO YOUR WORK, TAKE CARE OF THINGS AT HOME, OR GET ALONG WITH OTHER PEOPLE: 1
SUM OF ALL RESPONSES TO PHQ QUESTIONS 1-9: 5
1. LITTLE INTEREST OR PLEASURE IN DOING THINGS: 1
9. THOUGHTS THAT YOU WOULD BE BETTER OFF DEAD, OR OF HURTING YOURSELF: 0
SUM OF ALL RESPONSES TO PHQ QUESTIONS 1-9: 5
3. TROUBLE FALLING OR STAYING ASLEEP: 0
SUM OF ALL RESPONSES TO PHQ QUESTIONS 1-9: 5
8. MOVING OR SPEAKING SO SLOWLY THAT OTHER PEOPLE COULD HAVE NOTICED. OR THE OPPOSITE, BEING SO FIGETY OR RESTLESS THAT YOU HAVE BEEN MOVING AROUND A LOT MORE THAN USUAL: 0
SUM OF ALL RESPONSES TO PHQ QUESTIONS 1-9: 5
SUM OF ALL RESPONSES TO PHQ9 QUESTIONS 1 & 2: 2
5. POOR APPETITE OR OVEREATING: 1
4. FEELING TIRED OR HAVING LITTLE ENERGY: 0
2. FEELING DOWN, DEPRESSED OR HOPELESS: 1

## 2023-03-14 ASSESSMENT — ANXIETY QUESTIONNAIRES
4. TROUBLE RELAXING: 2
7. FEELING AFRAID AS IF SOMETHING AWFUL MIGHT HAPPEN: 0
5. BEING SO RESTLESS THAT IT IS HARD TO SIT STILL: 1
1. FEELING NERVOUS, ANXIOUS, OR ON EDGE: 3
IF YOU CHECKED OFF ANY PROBLEMS ON THIS QUESTIONNAIRE, HOW DIFFICULT HAVE THESE PROBLEMS MADE IT FOR YOU TO DO YOUR WORK, TAKE CARE OF THINGS AT HOME, OR GET ALONG WITH OTHER PEOPLE: VERY DIFFICULT
6. BECOMING EASILY ANNOYED OR IRRITABLE: 0
GAD7 TOTAL SCORE: 8
2. NOT BEING ABLE TO STOP OR CONTROL WORRYING: 2
3. WORRYING TOO MUCH ABOUT DIFFERENT THINGS: 0

## 2023-03-14 ASSESSMENT — ENCOUNTER SYMPTOMS
BACK PAIN: 1
BOWEL INCONTINENCE: 0

## 2023-03-14 NOTE — PATIENT INSTRUCTIONS
Cymbalta daily for anxiety and back pain. Buspar  - may use twice daily for anxiety. Only use as needed. Meloxicam daily for back and leg pain. Take with food. Follow up in 3 weeks.

## 2023-03-14 NOTE — PROGRESS NOTES
[address], APRN-CNP  [address]  Phone:  [phone]  Fax:  [phone]  Coleman Salazar is a 28 y.o. male who presents today for his medical conditions/complaints as noted below. Coleman Salazar c/o of Panic Attack (Has been having issues with nerve pain woke up sat with cold chills, and could not move from the back pain. Passed 2 days had cold sweats due to \"mental issues\"  Was at work today and \"\"started freaking out\" and was able to leave to be seen )      HPI:     Mental Health Problem  The primary symptoms include somatic symptoms (cold and sweats, nausea,). Primary symptoms comment: panic attacks and anxiety. Took melatonin over the weekend. Had to leave work today because of panic. . The current episode started this week. This is a recurrent problem. Somatic symptoms include fatigue and back pain. Precipitated by: lack of sleep. Sequelae of the illness include an inability to work. Additional symptoms of the illness include anhedonia, insomnia, appetite change and fatigue. Additional symptoms of the illness do not include agitation. He does not admit to suicidal ideas. He does not have a plan to attempt suicide. He does not contemplate harming himself. He has not already injured self. He does not contemplate injuring another person. He has not already  injured another person. Risk factors that are present for mental illness include a history of mental illness and a family history of mental illness. Back Pain  This is a chronic problem. The current episode started more than 1 month ago. The problem occurs intermittently. The problem has been waxing and waning since onset. The pain is present in the lumbar spine. The pain radiates to the left thigh and left knee. The pain is at a severity of 2/10. Pertinent negatives include no bladder incontinence or bowel incontinence. Patient does not currently have a provider.   Is going to establish tomorrow. Pain in the lower left back, hip and leg. Wt Readings from Last 3 Encounters:   03/14/23 218 lb (98.9 kg)   08/09/19 218 lb (98.9 kg)   02/07/19 217 lb (98.4 kg)       Temp Readings from Last 3 Encounters:   03/14/23 97.2 °F (36.2 °C) (Tympanic)       BP Readings from Last 3 Encounters:   03/14/23 124/84   08/09/19 124/64   02/07/19 124/76       Pulse Readings from Last 3 Encounters:   03/14/23 78   08/09/19 90   02/07/19 81        SpO2 Readings from Last 3 Encounters:   03/14/23 99%   08/09/19 98%   02/07/19 99%             Past Medical History:   Diagnosis Date    Anxiety       History reviewed. No pertinent surgical history. Family History   Problem Relation Age of Onset    Mental Illness Mother         took xanax prn over years     Social History     Tobacco Use    Smoking status: Never    Smokeless tobacco: Current     Types: Chew   Substance Use Topics    Alcohol use: Not on file      Current Outpatient Medications   Medication Sig Dispense Refill    DULoxetine (CYMBALTA) 30 MG extended release capsule Take 1 capsule by mouth daily 30 capsule 0    meloxicam (MOBIC) 15 MG tablet Take 1 tablet by mouth daily 30 tablet 0    busPIRone (BUSPAR) 5 MG tablet Take 1 tablet by mouth 2 times daily 60 tablet 0     No current facility-administered medications for this visit. No Known Allergies    No results found. YURI 7 SCORE 3/14/2023   YURI-7 Total Score 8     Interpretation of YURI-7 score: 5-9 = mild anxiety, 10-14 = moderate anxiety, 15+ = severe anxiety. Recommend referral to behavioral health for scores 10 or greater. PHQ-9 Total Score: 5 (3/14/2023 10:19 AM)  Thoughts that you would be better off dead, or of hurting yourself in some way: 0 (3/14/2023 10:19 AM)   Subjective:      Review of Systems   Constitutional:  Positive for appetite change and fatigue. Gastrointestinal:  Negative for bowel incontinence. Genitourinary:  Negative for bladder incontinence.    Musculoskeletal: Positive for back pain. Psychiatric/Behavioral:  Negative for agitation. The patient has insomnia. Objective:     /84 (Site: Right Upper Arm, Position: Sitting, Cuff Size: Large Adult)   Pulse 78   Temp 97.2 °F (36.2 °C) (Tympanic)   Wt 218 lb (98.9 kg)   SpO2 99%   BMI 30.40 kg/m²     Physical Exam  Constitutional:       General: He is not in acute distress. Appearance: He is well-developed. He is not ill-appearing, toxic-appearing or diaphoretic. HENT:      Head: Normocephalic. Right Ear: External ear normal.      Left Ear: External ear normal.      Nose: Nose normal. No mucosal edema. Mouth/Throat:      Mouth: Mucous membranes are not pale and not dry. Eyes:      General: Lids are normal. No scleral icterus. Right eye: No discharge. Left eye: No discharge. Extraocular Movements: Extraocular movements intact. Right eye: No nystagmus. Left eye: No nystagmus. Conjunctiva/sclera: Conjunctivae normal.      Pupils: Pupils are equal, round, and reactive to light. Neck:      Trachea: Trachea normal.   Cardiovascular:      Heart sounds: Normal heart sounds. Pulmonary:      Effort: Pulmonary effort is normal. No accessory muscle usage. Musculoskeletal:         General: Normal range of motion. Cervical back: Full passive range of motion without pain and normal range of motion. Skin:     Capillary Refill: Capillary refill takes less than 2 seconds. Coloration: Skin is not pale. Neurological:      Mental Status: He is alert and oriented to person, place, and time. Psychiatric:         Attention and Perception: Attention and perception normal.         Mood and Affect: Mood is anxious. Affect is tearful. Speech: Speech normal.         Behavior: Behavior normal.         Thought Content:  Thought content normal.         Cognition and Memory: Cognition and memory normal.         Judgment: Judgment normal.       Assessment: Diagnosis Orders   1. Anxiety  DULoxetine (CYMBALTA) 30 MG extended release capsule    busPIRone (BUSPAR) 5 MG tablet      2. Back pain with radiculopathy  DULoxetine (CYMBALTA) 30 MG extended release capsule    meloxicam (MOBIC) 15 MG tablet        No results found for this visit on 03/14/23. Plan:       Cymbalta daily for anxiety and back pain. Buspar  - may use twice daily for anxiety. Only use as needed. Meloxicam daily for back and leg pain. Take with food. Follow up in 3 weeks. Patient Instructions   Cymbalta daily for anxiety and back pain. Buspar  - may use twice daily for anxiety. Only use as needed. Meloxicam daily for back and leg pain. Take with food. Follow up in 3 weeks. Patient/Caregiver instructed on use, benefit, and side effects of prescribed medications. All patient/parent/caregiver questions answered. Patient/parent/caregiver voiced understanding. Reviewed health maintenance. Instructed to continue current medications, diet and exercise. Patient agreed with treatment plan. Follow up as directed.            Electronically signed by ARIEL Belle NP on3/14/2023

## 2023-03-15 ENCOUNTER — TELEPHONE (OUTPATIENT)
Dept: FAMILY MEDICINE CLINIC | Age: 28
End: 2023-03-15

## 2023-03-15 NOTE — TELEPHONE ENCOUNTER
Called patient in regards to missed OV with .  Spoke to patient and he states he is establishing with another provider

## 2023-04-04 ENCOUNTER — OFFICE VISIT (OUTPATIENT)
Dept: FAMILY MEDICINE CLINIC | Age: 28
End: 2023-04-04
Payer: COMMERCIAL

## 2023-04-04 VITALS
WEIGHT: 221 LBS | BODY MASS INDEX: 29.93 KG/M2 | HEIGHT: 72 IN | RESPIRATION RATE: 16 BRPM | TEMPERATURE: 97.4 F | OXYGEN SATURATION: 97 % | DIASTOLIC BLOOD PRESSURE: 80 MMHG | HEART RATE: 84 BPM | SYSTOLIC BLOOD PRESSURE: 122 MMHG

## 2023-04-04 DIAGNOSIS — F33.41 RECURRENT MAJOR DEPRESSIVE DISORDER, IN PARTIAL REMISSION (HCC): ICD-10-CM

## 2023-04-04 DIAGNOSIS — F41.9 ANXIETY: ICD-10-CM

## 2023-04-04 DIAGNOSIS — M54.42 CHRONIC LOW BACK PAIN WITH LEFT-SIDED SCIATICA, UNSPECIFIED BACK PAIN LATERALITY: Primary | ICD-10-CM

## 2023-04-04 DIAGNOSIS — G89.29 CHRONIC LOW BACK PAIN WITH LEFT-SIDED SCIATICA, UNSPECIFIED BACK PAIN LATERALITY: Primary | ICD-10-CM

## 2023-04-04 DIAGNOSIS — M54.10 BACK PAIN WITH RADICULOPATHY: ICD-10-CM

## 2023-04-04 PROCEDURE — G8427 DOCREV CUR MEDS BY ELIG CLIN: HCPCS | Performed by: NURSE PRACTITIONER

## 2023-04-04 PROCEDURE — 99214 OFFICE O/P EST MOD 30 MIN: CPT | Performed by: NURSE PRACTITIONER

## 2023-04-04 PROCEDURE — 4004F PT TOBACCO SCREEN RCVD TLK: CPT | Performed by: NURSE PRACTITIONER

## 2023-04-04 PROCEDURE — G8417 CALC BMI ABV UP PARAM F/U: HCPCS | Performed by: NURSE PRACTITIONER

## 2023-04-04 RX ORDER — MELOXICAM 15 MG/1
15 TABLET ORAL DAILY
Qty: 90 TABLET | Refills: 1 | Status: SHIPPED | OUTPATIENT
Start: 2023-04-04

## 2023-04-04 RX ORDER — BUSPIRONE HYDROCHLORIDE 5 MG/1
5 TABLET ORAL 2 TIMES DAILY
Qty: 180 TABLET | Refills: 1 | Status: SHIPPED | OUTPATIENT
Start: 2023-04-04 | End: 2023-10-01

## 2023-04-04 RX ORDER — DULOXETIN HYDROCHLORIDE 30 MG/1
30 CAPSULE, DELAYED RELEASE ORAL DAILY
Qty: 30 CAPSULE | Refills: 5 | Status: SHIPPED | OUTPATIENT
Start: 2023-04-04

## 2023-04-04 ASSESSMENT — ANXIETY QUESTIONNAIRES
1. FEELING NERVOUS, ANXIOUS, OR ON EDGE: 1
IF YOU CHECKED OFF ANY PROBLEMS ON THIS QUESTIONNAIRE, HOW DIFFICULT HAVE THESE PROBLEMS MADE IT FOR YOU TO DO YOUR WORK, TAKE CARE OF THINGS AT HOME, OR GET ALONG WITH OTHER PEOPLE: NOT DIFFICULT AT ALL
7. FEELING AFRAID AS IF SOMETHING AWFUL MIGHT HAPPEN: 0
3. WORRYING TOO MUCH ABOUT DIFFERENT THINGS: 0
4. TROUBLE RELAXING: 0
2. NOT BEING ABLE TO STOP OR CONTROL WORRYING: 0
5. BEING SO RESTLESS THAT IT IS HARD TO SIT STILL: 1
GAD7 TOTAL SCORE: 2
6. BECOMING EASILY ANNOYED OR IRRITABLE: 0

## 2023-04-04 ASSESSMENT — PATIENT HEALTH QUESTIONNAIRE - PHQ9
SUM OF ALL RESPONSES TO PHQ QUESTIONS 1-9: 3
7. TROUBLE CONCENTRATING ON THINGS, SUCH AS READING THE NEWSPAPER OR WATCHING TELEVISION: 0
SUM OF ALL RESPONSES TO PHQ QUESTIONS 1-9: 3
9. THOUGHTS THAT YOU WOULD BE BETTER OFF DEAD, OR OF HURTING YOURSELF: 0
SUM OF ALL RESPONSES TO PHQ QUESTIONS 1-9: 3
SUM OF ALL RESPONSES TO PHQ QUESTIONS 1-9: 3
2. FEELING DOWN, DEPRESSED OR HOPELESS: 1
3. TROUBLE FALLING OR STAYING ASLEEP: 1
8. MOVING OR SPEAKING SO SLOWLY THAT OTHER PEOPLE COULD HAVE NOTICED. OR THE OPPOSITE, BEING SO FIGETY OR RESTLESS THAT YOU HAVE BEEN MOVING AROUND A LOT MORE THAN USUAL: 0
10. IF YOU CHECKED OFF ANY PROBLEMS, HOW DIFFICULT HAVE THESE PROBLEMS MADE IT FOR YOU TO DO YOUR WORK, TAKE CARE OF THINGS AT HOME, OR GET ALONG WITH OTHER PEOPLE: 0
1. LITTLE INTEREST OR PLEASURE IN DOING THINGS: 0
5. POOR APPETITE OR OVEREATING: 0
SUM OF ALL RESPONSES TO PHQ9 QUESTIONS 1 & 2: 1
4. FEELING TIRED OR HAVING LITTLE ENERGY: 1
6. FEELING BAD ABOUT YOURSELF - OR THAT YOU ARE A FAILURE OR HAVE LET YOURSELF OR YOUR FAMILY DOWN: 0

## 2023-04-04 ASSESSMENT — ENCOUNTER SYMPTOMS: BACK PAIN: 1

## 2023-04-04 NOTE — ASSESSMENT & PLAN NOTE
Borderline controlled, continue current medications  Improved. Recommended PT. Patient wishes to wait. Will give home exercises.

## 2023-04-04 NOTE — PROGRESS NOTES
[facility], APRN-CNP  [address]  [phone]        4/4/2023     Elana Diaz is a 28 y.o. male, here for evaluation of the following medical concerns:    Chief Complaint   Patient presents with    Anxiety     3 week follow up. Taking Duloxetine. Patient reports doing a lot better- not 100%. Back Pain     3 week follow up. Taking Duloxetine and Meloxicam.  He still has pain that radiates down left leg. He rates his back pain a 4. HPI  Patient comes in today for follow up of chronic health issues Patient back and leg pain, depression/anxiety. Patient's recent lab reports are as follows:    Results for orders placed or performed during the hospital encounter of 46/65/64   Basic Metabolic Panel   Result Value Ref Range    Glucose 88 70 - 99 mg/dL    BUN 11 6 - 20 mg/dL    Creatinine 0.85 0.70 - 1.20 mg/dL    Bun/Cre Ratio 13 9 - 20    Calcium 9.8 8.6 - 10.4 mg/dL    Sodium 140 135 - 144 mmol/L    Potassium 4.0 3.7 - 5.3 mmol/L    Chloride 100 98 - 107 mmol/L    CO2 29 20 - 31 mmol/L    Anion Gap 11 9 - 17 mmol/L    GFR Non-African American >60 >60 mL/min    GFR African American >60 >60 mL/min    GFR Comment          GFR Staging NOT REPORTED    Trav-Barr Virus VCA Antibody Panel   Result Value Ref Range    EBV VCA,IgG 1464 (H) <100 U/mL    EBV Nuclear Ag Ab 93 <100 U/mL    EBV EA IgG 35 <100 U/mL    EBV VCA,IgM 104 (H) <100 U/mL    EBV Interp.  (NOTE)    Mononucleosis Screen   Result Value Ref Range    Mononucleosis Screen NEGATIVE NEGATIVE   TSH With Reflex Ft4   Result Value Ref Range    TSH 1.05 0.30 - 5.00 mIU/L   CBC Auto Differential   Result Value Ref Range    WBC 8.7 3.5 - 11.0 k/uL    RBC 5.79 4.5 - 5.9 m/uL    Hemoglobin 17.1 13.5 - 17.5 g/dL    Hematocrit 49.8 41 - 53 %    MCV 86.0 80 - 100 fL    MCH 29.5 26 - 34 pg    MCHC 34.2 31 - 37 g/dL    RDW 13.3 11.0 - 14.5 %    Platelets 119 145 - 647 k/uL    MPV 8.4 6.0 - 12.0

## 2023-04-04 NOTE — PATIENT INSTRUCTIONS
Continue Buspar for anxiety as needed. Continue cymbalta for pain and depression. Continue Meloxicam for back pain. Practice exercises to strengthen your core. If no improvement after 3 weeks call for referral to PT. Follow up in 6 months.

## 2023-10-03 DIAGNOSIS — M54.10 BACK PAIN WITH RADICULOPATHY: ICD-10-CM

## 2023-10-03 DIAGNOSIS — F33.41 RECURRENT MAJOR DEPRESSIVE DISORDER, IN PARTIAL REMISSION (HCC): ICD-10-CM

## 2023-10-03 DIAGNOSIS — F41.9 ANXIETY: ICD-10-CM

## 2023-10-03 DIAGNOSIS — G89.29 CHRONIC LOW BACK PAIN WITH LEFT-SIDED SCIATICA, UNSPECIFIED BACK PAIN LATERALITY: ICD-10-CM

## 2023-10-03 DIAGNOSIS — M54.42 CHRONIC LOW BACK PAIN WITH LEFT-SIDED SCIATICA, UNSPECIFIED BACK PAIN LATERALITY: ICD-10-CM

## 2023-10-03 RX ORDER — BUSPIRONE HYDROCHLORIDE 5 MG/1
5 TABLET ORAL 2 TIMES DAILY
Qty: 60 TABLET | Refills: 0 | Status: SHIPPED | OUTPATIENT
Start: 2023-10-03 | End: 2023-10-09 | Stop reason: ALTCHOICE

## 2023-10-03 RX ORDER — DULOXETIN HYDROCHLORIDE 30 MG/1
30 CAPSULE, DELAYED RELEASE ORAL DAILY
Qty: 30 CAPSULE | Refills: 0 | Status: SHIPPED | OUTPATIENT
Start: 2023-10-03 | End: 2023-10-09 | Stop reason: SDUPTHER

## 2023-10-09 ENCOUNTER — OFFICE VISIT (OUTPATIENT)
Dept: FAMILY MEDICINE CLINIC | Age: 28
End: 2023-10-09
Payer: COMMERCIAL

## 2023-10-09 VITALS
SYSTOLIC BLOOD PRESSURE: 130 MMHG | RESPIRATION RATE: 16 BRPM | TEMPERATURE: 98.7 F | HEART RATE: 89 BPM | HEIGHT: 72 IN | OXYGEN SATURATION: 97 % | DIASTOLIC BLOOD PRESSURE: 80 MMHG | WEIGHT: 220.2 LBS | BODY MASS INDEX: 29.82 KG/M2

## 2023-10-09 DIAGNOSIS — F41.9 ANXIETY: ICD-10-CM

## 2023-10-09 DIAGNOSIS — M54.10 BACK PAIN WITH RADICULOPATHY: ICD-10-CM

## 2023-10-09 DIAGNOSIS — Z00.00 ENCOUNTER FOR WELL ADULT EXAM WITHOUT ABNORMAL FINDINGS: Primary | ICD-10-CM

## 2023-10-09 DIAGNOSIS — M54.42 CHRONIC LOW BACK PAIN WITH LEFT-SIDED SCIATICA, UNSPECIFIED BACK PAIN LATERALITY: ICD-10-CM

## 2023-10-09 DIAGNOSIS — G89.29 CHRONIC LOW BACK PAIN WITH LEFT-SIDED SCIATICA, UNSPECIFIED BACK PAIN LATERALITY: ICD-10-CM

## 2023-10-09 PROCEDURE — G8484 FLU IMMUNIZE NO ADMIN: HCPCS | Performed by: NURSE PRACTITIONER

## 2023-10-09 PROCEDURE — 99395 PREV VISIT EST AGE 18-39: CPT | Performed by: NURSE PRACTITIONER

## 2023-10-09 RX ORDER — DULOXETIN HYDROCHLORIDE 30 MG/1
60 CAPSULE, DELAYED RELEASE ORAL DAILY
Qty: 60 CAPSULE | Refills: 5 | Status: SHIPPED | OUTPATIENT
Start: 2023-10-09

## 2023-10-09 ASSESSMENT — ANXIETY QUESTIONNAIRES
1. FEELING NERVOUS, ANXIOUS, OR ON EDGE: 3
7. FEELING AFRAID AS IF SOMETHING AWFUL MIGHT HAPPEN: 1
5. BEING SO RESTLESS THAT IT IS HARD TO SIT STILL: 1
2. NOT BEING ABLE TO STOP OR CONTROL WORRYING: 2
GAD7 TOTAL SCORE: 9
4. TROUBLE RELAXING: 0
3. WORRYING TOO MUCH ABOUT DIFFERENT THINGS: 2
6. BECOMING EASILY ANNOYED OR IRRITABLE: 0

## 2023-10-09 ASSESSMENT — PATIENT HEALTH QUESTIONNAIRE - PHQ9
10. IF YOU CHECKED OFF ANY PROBLEMS, HOW DIFFICULT HAVE THESE PROBLEMS MADE IT FOR YOU TO DO YOUR WORK, TAKE CARE OF THINGS AT HOME, OR GET ALONG WITH OTHER PEOPLE: 1
SUM OF ALL RESPONSES TO PHQ QUESTIONS 1-9: 6
2. FEELING DOWN, DEPRESSED OR HOPELESS: 2
SUM OF ALL RESPONSES TO PHQ9 QUESTIONS 1 & 2: 3
6. FEELING BAD ABOUT YOURSELF - OR THAT YOU ARE A FAILURE OR HAVE LET YOURSELF OR YOUR FAMILY DOWN: 0
7. TROUBLE CONCENTRATING ON THINGS, SUCH AS READING THE NEWSPAPER OR WATCHING TELEVISION: 0
1. LITTLE INTEREST OR PLEASURE IN DOING THINGS: 1
SUM OF ALL RESPONSES TO PHQ QUESTIONS 1-9: 6
9. THOUGHTS THAT YOU WOULD BE BETTER OFF DEAD, OR OF HURTING YOURSELF: 0
SUM OF ALL RESPONSES TO PHQ QUESTIONS 1-9: 6
SUM OF ALL RESPONSES TO PHQ QUESTIONS 1-9: 6
8. MOVING OR SPEAKING SO SLOWLY THAT OTHER PEOPLE COULD HAVE NOTICED. OR THE OPPOSITE, BEING SO FIGETY OR RESTLESS THAT YOU HAVE BEEN MOVING AROUND A LOT MORE THAN USUAL: 0
4. FEELING TIRED OR HAVING LITTLE ENERGY: 2
5. POOR APPETITE OR OVEREATING: 0
3. TROUBLE FALLING OR STAYING ASLEEP: 1

## 2023-10-09 ASSESSMENT — ENCOUNTER SYMPTOMS: BACK PAIN: 0

## 2023-10-09 NOTE — PROGRESS NOTES
Mo Ruggiero, APRN-[facility]  [address], [facility] [place],[facility]  [phone]        10/9/2023     Delma Collazo is a 33 y.o. male, here for evaluation of the following medical concerns:    Chief Complaint   Patient presents with    Back Pain     6 month follow up- he has not had much pain at all. Patient does not wish to have a physical today. Depression     6 month follow up- sx have improved but remain. HPI    Patient Active Problem List   Diagnosis    Major depressive disorder, recurrent (HCC)    Severe episode of recurrent major depressive disorder, without psychotic features ([address])    Alcohol abuse    Chronic low back pain with left-sided sciatica       Patient's recent lab reports are as follows:      Results for orders placed or performed during the hospital encounter of 14/54/81   Basic Metabolic Panel   Result Value Ref Range    Glucose 88 70 - 99 mg/dL    BUN 11 6 - 20 mg/dL    Creatinine 0.85 0.70 - 1.20 mg/dL    Bun/Cre Ratio 13 9 - 20    Calcium 9.8 8.6 - 10.4 mg/dL    Sodium 140 135 - 144 mmol/L    Potassium 4.0 3.7 - 5.3 mmol/L    Chloride 100 98 - 107 mmol/L    CO2 29 20 - 31 mmol/L    Anion Gap 11 9 - 17 mmol/L    GFR Non-African American >60 >60 mL/min    GFR African American >60 >60 mL/min    GFR Comment          GFR Staging NOT REPORTED    Trav-Barr Virus VCA Antibody Panel   Result Value Ref Range    EBV VCA,IgG 1464 (H) <100 U/mL    EBV Nuclear Ag Ab 93 <100 U/mL    EBV EA IgG 35 <100 U/mL    EBV VCA,IgM 104 (H) <100 U/mL    EBV Interp.  (NOTE)    Mononucleosis Screen   Result Value Ref Range    Mononucleosis Screen NEGATIVE NEGATIVE   TSH With Reflex Ft4   Result Value Ref Range    TSH 1.05 0.30 - 5.00 mIU/L   CBC Auto Differential   Result Value Ref Range    WBC 8.7 3.5 - 11.0 k/uL    RBC 5.79 4.5 - 5.9 m/uL    Hemoglobin 17.1 13.5 - 17.5 g/dL    Hematocrit 49.8 41 - 53 %    MCV 86.0 80 - 100 fL    MCH 29.5 26 - 34 pg    MCHC 34.2 31 - 37

## 2023-11-02 DIAGNOSIS — F41.9 ANXIETY: ICD-10-CM

## 2023-11-02 DIAGNOSIS — F33.41 RECURRENT MAJOR DEPRESSIVE DISORDER, IN PARTIAL REMISSION (HCC): ICD-10-CM

## 2023-11-02 DIAGNOSIS — G89.29 CHRONIC LOW BACK PAIN WITH LEFT-SIDED SCIATICA, UNSPECIFIED BACK PAIN LATERALITY: ICD-10-CM

## 2023-11-02 DIAGNOSIS — M54.10 BACK PAIN WITH RADICULOPATHY: ICD-10-CM

## 2023-11-02 DIAGNOSIS — M54.42 CHRONIC LOW BACK PAIN WITH LEFT-SIDED SCIATICA, UNSPECIFIED BACK PAIN LATERALITY: ICD-10-CM

## 2023-11-06 RX ORDER — DULOXETIN HYDROCHLORIDE 30 MG/1
30 CAPSULE, DELAYED RELEASE ORAL DAILY
Qty: 30 CAPSULE | OUTPATIENT
Start: 2023-11-06

## 2023-11-06 RX ORDER — BUSPIRONE HYDROCHLORIDE 5 MG/1
5 TABLET ORAL 2 TIMES DAILY
Qty: 60 TABLET | Refills: 0 | Status: SHIPPED | OUTPATIENT
Start: 2023-11-06

## 2024-04-02 DIAGNOSIS — G89.29 CHRONIC LOW BACK PAIN WITH LEFT-SIDED SCIATICA, UNSPECIFIED BACK PAIN LATERALITY: ICD-10-CM

## 2024-04-02 DIAGNOSIS — M54.42 CHRONIC LOW BACK PAIN WITH LEFT-SIDED SCIATICA, UNSPECIFIED BACK PAIN LATERALITY: ICD-10-CM

## 2024-04-02 DIAGNOSIS — M54.10 BACK PAIN WITH RADICULOPATHY: ICD-10-CM

## 2024-04-02 DIAGNOSIS — F41.9 ANXIETY: ICD-10-CM

## 2024-04-02 RX ORDER — DULOXETIN HYDROCHLORIDE 60 MG/1
60 CAPSULE, DELAYED RELEASE ORAL DAILY
Qty: 30 CAPSULE | Refills: 0 | Status: SHIPPED | OUTPATIENT
Start: 2024-04-02

## 2024-04-02 NOTE — TELEPHONE ENCOUNTER
Rodrick Reese is requesting a refill on the following medication(s):  Requested Prescriptions     Pending Prescriptions Disp Refills    DULoxetine (CYMBALTA) 60 MG extended release capsule [Pharmacy Med Name: DULOXETINE HCL DR 60 MG CAP] 30 capsule      Sig: take 1 capsule by mouth once daily       Last Visit Date (If Applicable):  10/9/2023    Next Visit Date:    4/9/2024

## 2024-04-09 ENCOUNTER — OFFICE VISIT (OUTPATIENT)
Dept: FAMILY MEDICINE CLINIC | Age: 29
End: 2024-04-09
Payer: COMMERCIAL

## 2024-04-09 VITALS
OXYGEN SATURATION: 96 % | WEIGHT: 225.6 LBS | TEMPERATURE: 98.5 F | HEART RATE: 92 BPM | BODY MASS INDEX: 30.56 KG/M2 | RESPIRATION RATE: 16 BRPM | DIASTOLIC BLOOD PRESSURE: 82 MMHG | HEIGHT: 72 IN | SYSTOLIC BLOOD PRESSURE: 122 MMHG

## 2024-04-09 DIAGNOSIS — M25.562 ACUTE PAIN OF LEFT KNEE: Primary | ICD-10-CM

## 2024-04-09 DIAGNOSIS — G89.29 CHRONIC LOW BACK PAIN WITH LEFT-SIDED SCIATICA, UNSPECIFIED BACK PAIN LATERALITY: ICD-10-CM

## 2024-04-09 DIAGNOSIS — M54.42 CHRONIC LOW BACK PAIN WITH LEFT-SIDED SCIATICA, UNSPECIFIED BACK PAIN LATERALITY: ICD-10-CM

## 2024-04-09 DIAGNOSIS — F41.9 ANXIETY: ICD-10-CM

## 2024-04-09 PROCEDURE — G8417 CALC BMI ABV UP PARAM F/U: HCPCS | Performed by: NURSE PRACTITIONER

## 2024-04-09 PROCEDURE — 4004F PT TOBACCO SCREEN RCVD TLK: CPT | Performed by: NURSE PRACTITIONER

## 2024-04-09 PROCEDURE — 99214 OFFICE O/P EST MOD 30 MIN: CPT | Performed by: NURSE PRACTITIONER

## 2024-04-09 PROCEDURE — G8427 DOCREV CUR MEDS BY ELIG CLIN: HCPCS | Performed by: NURSE PRACTITIONER

## 2024-04-09 RX ORDER — DULOXETIN HYDROCHLORIDE 60 MG/1
60 CAPSULE, DELAYED RELEASE ORAL DAILY
Qty: 90 CAPSULE | Refills: 1 | Status: SHIPPED | OUTPATIENT
Start: 2024-04-09

## 2024-04-09 RX ORDER — NAPROXEN 375 MG/1
375 TABLET ORAL 2 TIMES DAILY WITH MEALS
Qty: 60 TABLET | Refills: 0 | Status: SHIPPED | OUTPATIENT
Start: 2024-04-09

## 2024-04-09 SDOH — ECONOMIC STABILITY: INCOME INSECURITY: HOW HARD IS IT FOR YOU TO PAY FOR THE VERY BASICS LIKE FOOD, HOUSING, MEDICAL CARE, AND HEATING?: NOT HARD AT ALL

## 2024-04-09 SDOH — ECONOMIC STABILITY: FOOD INSECURITY: WITHIN THE PAST 12 MONTHS, THE FOOD YOU BOUGHT JUST DIDN'T LAST AND YOU DIDN'T HAVE MONEY TO GET MORE.: NEVER TRUE

## 2024-04-09 SDOH — ECONOMIC STABILITY: FOOD INSECURITY: WITHIN THE PAST 12 MONTHS, YOU WORRIED THAT YOUR FOOD WOULD RUN OUT BEFORE YOU GOT MONEY TO BUY MORE.: NEVER TRUE

## 2024-04-09 ASSESSMENT — PATIENT HEALTH QUESTIONNAIRE - PHQ9
2. FEELING DOWN, DEPRESSED OR HOPELESS: NOT AT ALL
SUM OF ALL RESPONSES TO PHQ QUESTIONS 1-9: 4
SUM OF ALL RESPONSES TO PHQ QUESTIONS 1-9: 4
10. IF YOU CHECKED OFF ANY PROBLEMS, HOW DIFFICULT HAVE THESE PROBLEMS MADE IT FOR YOU TO DO YOUR WORK, TAKE CARE OF THINGS AT HOME, OR GET ALONG WITH OTHER PEOPLE: SOMEWHAT DIFFICULT
7. TROUBLE CONCENTRATING ON THINGS, SUCH AS READING THE NEWSPAPER OR WATCHING TELEVISION: NOT AT ALL
1. LITTLE INTEREST OR PLEASURE IN DOING THINGS: NOT AT ALL
5. POOR APPETITE OR OVEREATING: NOT AT ALL
6. FEELING BAD ABOUT YOURSELF - OR THAT YOU ARE A FAILURE OR HAVE LET YOURSELF OR YOUR FAMILY DOWN: NOT AT ALL
SUM OF ALL RESPONSES TO PHQ QUESTIONS 1-9: 4
SUM OF ALL RESPONSES TO PHQ9 QUESTIONS 1 & 2: 0
SUM OF ALL RESPONSES TO PHQ QUESTIONS 1-9: 4
3. TROUBLE FALLING OR STAYING ASLEEP: SEVERAL DAYS
9. THOUGHTS THAT YOU WOULD BE BETTER OFF DEAD, OR OF HURTING YOURSELF: NOT AT ALL
4. FEELING TIRED OR HAVING LITTLE ENERGY: NEARLY EVERY DAY
8. MOVING OR SPEAKING SO SLOWLY THAT OTHER PEOPLE COULD HAVE NOTICED. OR THE OPPOSITE, BEING SO FIGETY OR RESTLESS THAT YOU HAVE BEEN MOVING AROUND A LOT MORE THAN USUAL: NOT AT ALL

## 2024-04-09 NOTE — PROGRESS NOTES
Marylou Whaley, APRN-CNP  [place] [id number]  [phone]        4/9/2024     Rodrick Reese is a 28 y.o. male, here for evaluation of the following medical concerns:    Chief Complaint   Patient presents with    Back Pain     6 month follow up    Knee Pain     Weakness- sx for few months. No known injury.    Depression        HPI    Patient Active Problem List   Diagnosis    Major depressive disorder, recurrent (HCC)    Alcohol abuse    Chronic low back pain with left-sided sciatica   Also having left knee pain and weakness.  Knee wants to give out on him.     Patient's recent lab reports are as follows:      Results for orders placed or performed during the hospital encounter of 08/10/19   Basic Metabolic Panel   Result Value Ref Range    Glucose 88 70 - 99 mg/dL    BUN 11 6 - 20 mg/dL    Creatinine 0.85 0.70 - 1.20 mg/dL    Bun/Cre Ratio 13 9 - 20    Calcium 9.8 8.6 - 10.4 mg/dL    Sodium 140 135 - 144 mmol/L    Potassium 4.0 3.7 - 5.3 mmol/L    Chloride 100 98 - 107 mmol/L    CO2 29 20 - 31 mmol/L    Anion Gap 11 9 - 17 mmol/L    GFR Non-African American >60 >60 mL/min    GFR African American >60 >60 mL/min    GFR Comment          GFR Staging NOT REPORTED    Trav-Barr Virus VCA Antibody Panel   Result Value Ref Range    EBV VCA,IgG 1464 (H) <100 U/mL    EBV Nuclear Ag Ab 93 <100 U/mL    EBV EA IgG 35 <100 U/mL    EBV VCA,IgM 104 (H) <100 U/mL    EBV Interp. (NOTE)    Mononucleosis Screen   Result Value Ref Range    Mononucleosis Screen NEGATIVE NEGATIVE   TSH With Reflex Ft4   Result Value Ref Range    TSH 1.05 0.30 - 5.00 mIU/L   CBC Auto Differential   Result Value Ref Range    WBC 8.7 3.5 - 11.0 k/uL    RBC 5.79 4.5 - 5.9 m/uL    Hemoglobin 17.1 13.5 - 17.5 g/dL    Hematocrit 49.8 41 - 53 %    MCV 86.0 80 - 100 fL    MCH 29.5 26 - 34 pg    MCHC 34.2 31 - 37 g/dL    RDW 13.3 11.0 - 14.5 %    Platelets 269 140 - 450 k/uL    MPV 8.4 6.0 - 12.0 fL

## 2024-04-11 PROBLEM — F33.2 SEVERE EPISODE OF RECURRENT MAJOR DEPRESSIVE DISORDER, WITHOUT PSYCHOTIC FEATURES (HCC): Status: RESOLVED | Noted: 2018-06-03 | Resolved: 2024-04-11

## 2024-04-11 ASSESSMENT — ENCOUNTER SYMPTOMS: BACK PAIN: 1

## 2024-10-08 ENCOUNTER — OFFICE VISIT (OUTPATIENT)
Dept: FAMILY MEDICINE CLINIC | Age: 29
End: 2024-10-08
Payer: COMMERCIAL

## 2024-10-08 VITALS
HEART RATE: 82 BPM | SYSTOLIC BLOOD PRESSURE: 146 MMHG | OXYGEN SATURATION: 99 % | BODY MASS INDEX: 30.9 KG/M2 | HEIGHT: 70 IN | DIASTOLIC BLOOD PRESSURE: 98 MMHG | WEIGHT: 215.8 LBS

## 2024-10-08 DIAGNOSIS — F33.41 RECURRENT MAJOR DEPRESSIVE DISORDER, IN PARTIAL REMISSION (HCC): Primary | ICD-10-CM

## 2024-10-08 DIAGNOSIS — R03.0 ELEVATED BLOOD PRESSURE READING: ICD-10-CM

## 2024-10-08 PROCEDURE — 4004F PT TOBACCO SCREEN RCVD TLK: CPT | Performed by: STUDENT IN AN ORGANIZED HEALTH CARE EDUCATION/TRAINING PROGRAM

## 2024-10-08 PROCEDURE — G8417 CALC BMI ABV UP PARAM F/U: HCPCS | Performed by: STUDENT IN AN ORGANIZED HEALTH CARE EDUCATION/TRAINING PROGRAM

## 2024-10-08 PROCEDURE — G8427 DOCREV CUR MEDS BY ELIG CLIN: HCPCS | Performed by: STUDENT IN AN ORGANIZED HEALTH CARE EDUCATION/TRAINING PROGRAM

## 2024-10-08 PROCEDURE — G8484 FLU IMMUNIZE NO ADMIN: HCPCS | Performed by: STUDENT IN AN ORGANIZED HEALTH CARE EDUCATION/TRAINING PROGRAM

## 2024-10-08 PROCEDURE — 99213 OFFICE O/P EST LOW 20 MIN: CPT | Performed by: STUDENT IN AN ORGANIZED HEALTH CARE EDUCATION/TRAINING PROGRAM

## 2024-10-08 NOTE — PROGRESS NOTES
Breath sounds: Normal breath sounds.   Abdominal:      General: There is no distension.      Tenderness: There is no abdominal tenderness.   Musculoskeletal:         General: No deformity. Normal range of motion.      Cervical back: Normal range of motion and neck supple.      Right lower leg: No edema.      Left lower leg: No edema.   Skin:     General: Skin is warm and dry.      Findings: No lesion.   Neurological:      General: No focal deficit present.      Mental Status: He is alert and oriented to person, place, and time.   Psychiatric:         Mood and Affect: Mood normal.         Behavior: Behavior normal.           Please be aware portions of this note were completed using voice recognition software and unforeseen errors may have occurred    I personally reviewed the patient's past medical history, current medications, allergies, surgical history, family history and social history.  Updates were made as necessary.    Electronically signed by Kings Morgan MD on 10/17/2024 at 9:42 PM

## 2024-12-20 DIAGNOSIS — M54.42 CHRONIC LOW BACK PAIN WITH LEFT-SIDED SCIATICA, UNSPECIFIED BACK PAIN LATERALITY: ICD-10-CM

## 2024-12-20 DIAGNOSIS — G89.29 CHRONIC LOW BACK PAIN WITH LEFT-SIDED SCIATICA, UNSPECIFIED BACK PAIN LATERALITY: ICD-10-CM

## 2024-12-20 DIAGNOSIS — F41.9 ANXIETY: ICD-10-CM

## 2024-12-20 RX ORDER — DULOXETIN HYDROCHLORIDE 60 MG/1
60 CAPSULE, DELAYED RELEASE ORAL DAILY
Qty: 90 CAPSULE | Refills: 1 | Status: SHIPPED | OUTPATIENT
Start: 2024-12-20

## 2024-12-20 NOTE — TELEPHONE ENCOUNTER
Rodrick Reese is calling to request a refill on the following medication(s):  Requested Prescriptions     Pending Prescriptions Disp Refills    DULoxetine (CYMBALTA) 60 MG extended release capsule 90 capsule 1     Sig: Take 1 capsule by mouth daily       Last Visit Date (If Applicable):  10/8/2024    Next Visit Date:    4/8/2025

## 2025-01-29 DIAGNOSIS — G89.29 CHRONIC LOW BACK PAIN WITH LEFT-SIDED SCIATICA, UNSPECIFIED BACK PAIN LATERALITY: ICD-10-CM

## 2025-01-29 DIAGNOSIS — F41.9 ANXIETY: ICD-10-CM

## 2025-01-29 DIAGNOSIS — M54.42 CHRONIC LOW BACK PAIN WITH LEFT-SIDED SCIATICA, UNSPECIFIED BACK PAIN LATERALITY: ICD-10-CM

## 2025-01-29 NOTE — TELEPHONE ENCOUNTER
Rodrick Reese is requesting a refill on the following medication(s):  Requested Prescriptions     Pending Prescriptions Disp Refills    DULoxetine (CYMBALTA) 60 MG extended release capsule 90 capsule 1     Sig: Take 1 capsule by mouth daily       Last Visit Date (If Applicable):  10/8/2024    Next Visit Date:    4/8/2025

## 2025-01-30 RX ORDER — DULOXETIN HYDROCHLORIDE 60 MG/1
60 CAPSULE, DELAYED RELEASE ORAL DAILY
Qty: 90 CAPSULE | Refills: 0 | Status: SHIPPED | OUTPATIENT
Start: 2025-01-30

## 2025-03-11 DIAGNOSIS — G89.29 CHRONIC LOW BACK PAIN WITH LEFT-SIDED SCIATICA, UNSPECIFIED BACK PAIN LATERALITY: ICD-10-CM

## 2025-03-11 DIAGNOSIS — M54.42 CHRONIC LOW BACK PAIN WITH LEFT-SIDED SCIATICA, UNSPECIFIED BACK PAIN LATERALITY: ICD-10-CM

## 2025-03-11 DIAGNOSIS — F41.9 ANXIETY: ICD-10-CM

## 2025-03-12 NOTE — TELEPHONE ENCOUNTER
Rodrick Reese is requesting a refill on the following medication(s):  Requested Prescriptions     Pending Prescriptions Disp Refills    DULoxetine (CYMBALTA) 60 MG extended release capsule 90 capsule 0     Sig: Take 1 capsule by mouth daily       Last Visit Date (If Applicable):  10/8/2024    Next Visit Date:    4/8/2025

## 2025-03-13 RX ORDER — DULOXETIN HYDROCHLORIDE 60 MG/1
60 CAPSULE, DELAYED RELEASE ORAL DAILY
Qty: 90 CAPSULE | Refills: 0 | Status: SHIPPED | OUTPATIENT
Start: 2025-03-13

## 2025-04-08 ENCOUNTER — OFFICE VISIT (OUTPATIENT)
Dept: FAMILY MEDICINE CLINIC | Age: 30
End: 2025-04-08
Payer: COMMERCIAL

## 2025-04-08 VITALS
DIASTOLIC BLOOD PRESSURE: 76 MMHG | OXYGEN SATURATION: 98 % | WEIGHT: 225.8 LBS | SYSTOLIC BLOOD PRESSURE: 120 MMHG | BODY MASS INDEX: 32.24 KG/M2 | HEART RATE: 88 BPM

## 2025-04-08 DIAGNOSIS — Z91.038 HISTORY OF INSECT STING ALLERGY: ICD-10-CM

## 2025-04-08 DIAGNOSIS — F33.41 RECURRENT MAJOR DEPRESSIVE DISORDER, IN PARTIAL REMISSION: Primary | ICD-10-CM

## 2025-04-08 DIAGNOSIS — F41.9 ANXIETY: ICD-10-CM

## 2025-04-08 PROCEDURE — 99213 OFFICE O/P EST LOW 20 MIN: CPT | Performed by: STUDENT IN AN ORGANIZED HEALTH CARE EDUCATION/TRAINING PROGRAM

## 2025-04-08 PROCEDURE — G8417 CALC BMI ABV UP PARAM F/U: HCPCS | Performed by: STUDENT IN AN ORGANIZED HEALTH CARE EDUCATION/TRAINING PROGRAM

## 2025-04-08 PROCEDURE — 4004F PT TOBACCO SCREEN RCVD TLK: CPT | Performed by: STUDENT IN AN ORGANIZED HEALTH CARE EDUCATION/TRAINING PROGRAM

## 2025-04-08 PROCEDURE — G8427 DOCREV CUR MEDS BY ELIG CLIN: HCPCS | Performed by: STUDENT IN AN ORGANIZED HEALTH CARE EDUCATION/TRAINING PROGRAM

## 2025-04-08 RX ORDER — DULOXETIN HYDROCHLORIDE 60 MG/1
60 CAPSULE, DELAYED RELEASE ORAL DAILY
Qty: 90 CAPSULE | Refills: 1 | Status: SHIPPED | OUTPATIENT
Start: 2025-04-08

## 2025-04-08 SDOH — ECONOMIC STABILITY: FOOD INSECURITY: WITHIN THE PAST 12 MONTHS, YOU WORRIED THAT YOUR FOOD WOULD RUN OUT BEFORE YOU GOT MONEY TO BUY MORE.: NEVER TRUE

## 2025-04-08 SDOH — ECONOMIC STABILITY: FOOD INSECURITY: WITHIN THE PAST 12 MONTHS, THE FOOD YOU BOUGHT JUST DIDN'T LAST AND YOU DIDN'T HAVE MONEY TO GET MORE.: NEVER TRUE

## 2025-04-08 ASSESSMENT — PATIENT HEALTH QUESTIONNAIRE - PHQ9
10. IF YOU CHECKED OFF ANY PROBLEMS, HOW DIFFICULT HAVE THESE PROBLEMS MADE IT FOR YOU TO DO YOUR WORK, TAKE CARE OF THINGS AT HOME, OR GET ALONG WITH OTHER PEOPLE: NOT DIFFICULT AT ALL
SUM OF ALL RESPONSES TO PHQ QUESTIONS 1-9: 4
8. MOVING OR SPEAKING SO SLOWLY THAT OTHER PEOPLE COULD HAVE NOTICED. OR THE OPPOSITE, BEING SO FIGETY OR RESTLESS THAT YOU HAVE BEEN MOVING AROUND A LOT MORE THAN USUAL: NOT AT ALL
1. LITTLE INTEREST OR PLEASURE IN DOING THINGS: SEVERAL DAYS
SUM OF ALL RESPONSES TO PHQ QUESTIONS 1-9: 4
9. THOUGHTS THAT YOU WOULD BE BETTER OFF DEAD, OR OF HURTING YOURSELF: NOT AT ALL
7. TROUBLE CONCENTRATING ON THINGS, SUCH AS READING THE NEWSPAPER OR WATCHING TELEVISION: NOT AT ALL
6. FEELING BAD ABOUT YOURSELF - OR THAT YOU ARE A FAILURE OR HAVE LET YOURSELF OR YOUR FAMILY DOWN: NOT AT ALL
5. POOR APPETITE OR OVEREATING: NOT AT ALL
3. TROUBLE FALLING OR STAYING ASLEEP: SEVERAL DAYS
2. FEELING DOWN, DEPRESSED OR HOPELESS: NOT AT ALL
4. FEELING TIRED OR HAVING LITTLE ENERGY: MORE THAN HALF THE DAYS

## 2025-04-08 NOTE — PROGRESS NOTES
[address], [address]  [place] [address]  Phone: [phone]  Fax: [phone]      Date of Visit:  25   Patient Name: Rodrick Reese   Patient :  1995     ASSESSMENT/PLAN     1. Recurrent major depressive disorder, in partial remission  -     DULoxetine (CYMBALTA) 60 MG extended release capsule; Take 1 capsule by mouth daily, Disp-90 capsule, R-1Normal  2. History of insect sting allergy  3. Anxiety  -     DULoxetine (CYMBALTA) 60 MG extended release capsule; Take 1 capsule by mouth daily, Disp-90 capsule, R-1Normal     Will continue current dose of duloxetine.  Does have some acute life stressors, however would like to continue same dosing.  No adverse side effects from the med.    Follow up in 6 months    - Questions/concerns answered. Patient verbalized and expressed understanding. Medications, laboratory testing, imaging, consultation, and follow up as documented in this encounter.       HPI:     Rodrick Reese is a 29 y.o. male with   Patient Active Problem List   Diagnosis    Major depressive disorder, recurrent    Alcohol abuse    Chronic low back pain with left-sided sciatica     who presents today to discuss   Chief Complaint   Patient presents with    6 Month Follow-Up       HPI: Patient presents today for 6-month follow-up.  Here for duloxetine refills.  Has been going well.  Has had some life stressors with recent home and relationship issues, however would like to continue same dosing.  No additional questions or concerns.      Patient denies any fevers, chills, headaches, visual changes, lightheadedness, dizziness, chest pain, palpitations, shortness of breath, abdominal pain, nausea, vomiting, dysuria, hematuria, issues with bowel habits, numbness, tingling, issues with gait or ambulation.    REVIEW OF SYSTEM      As in HPI    REVIEWED INFORMATION      Current Outpatient Medications   Medication Sig Dispense Refill